# Patient Record
Sex: FEMALE | Race: BLACK OR AFRICAN AMERICAN | NOT HISPANIC OR LATINO | Employment: PART TIME | URBAN - METROPOLITAN AREA
[De-identification: names, ages, dates, MRNs, and addresses within clinical notes are randomized per-mention and may not be internally consistent; named-entity substitution may affect disease eponyms.]

---
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
## 2019-12-03 ENCOUNTER — HOSPITAL ENCOUNTER (EMERGENCY)
Facility: HOSPITAL | Age: 19
Discharge: HOME | End: 2019-12-03
Attending: EMERGENCY MEDICINE
Payer: MEDICARE

## 2019-12-03 ENCOUNTER — APPOINTMENT (EMERGENCY)
Dept: RADIOLOGY | Facility: HOSPITAL | Age: 19
End: 2019-12-03
Payer: MEDICARE

## 2019-12-03 VITALS
RESPIRATION RATE: 5 BRPM | HEART RATE: 74 BPM | OXYGEN SATURATION: 99 % | TEMPERATURE: 96.9 F | DIASTOLIC BLOOD PRESSURE: 70 MMHG | SYSTOLIC BLOOD PRESSURE: 123 MMHG | WEIGHT: 129.2 LBS

## 2019-12-03 DIAGNOSIS — R00.2 PALPITATIONS: Primary | ICD-10-CM

## 2019-12-03 LAB
EXT PREG TEST URINE: NORMAL
EXT. CONTROL ED NAV: NEGATIVE

## 2019-12-03 PROCEDURE — 71046 X-RAY EXAM CHEST 2 VIEWS: CPT

## 2019-12-03 PROCEDURE — 99285 EMERGENCY DEPT VISIT HI MDM: CPT

## 2019-12-03 PROCEDURE — 99285 EMERGENCY DEPT VISIT HI MDM: CPT | Performed by: EMERGENCY MEDICINE

## 2019-12-03 PROCEDURE — 93005 ELECTROCARDIOGRAM TRACING: CPT

## 2019-12-03 PROCEDURE — 81025 URINE PREGNANCY TEST: CPT | Performed by: EMERGENCY MEDICINE

## 2019-12-03 RX ORDER — IBUPROFEN 600 MG/1
600 TABLET ORAL ONCE
Status: COMPLETED | OUTPATIENT
Start: 2019-12-03 | End: 2019-12-03

## 2019-12-03 RX ADMIN — IBUPROFEN 600 MG: 600 TABLET, FILM COATED ORAL at 20:57

## 2019-12-04 LAB
ATRIAL RATE: 62 BPM
P AXIS: 54 DEGREES
PR INTERVAL: 162 MS
QRS AXIS: 57 DEGREES
QRSD INTERVAL: 76 MS
QT INTERVAL: 388 MS
QTC INTERVAL: 393 MS
T WAVE AXIS: 41 DEGREES
VENTRICULAR RATE: 62 BPM

## 2019-12-04 PROCEDURE — 93010 ELECTROCARDIOGRAM REPORT: CPT | Performed by: INTERNAL MEDICINE

## 2019-12-04 NOTE — ED PROVIDER NOTES
History  Chief Complaint   Patient presents with    Chest Pain     pt  reporting heart palpitations & chest pain   "my heart & my chest is mk' in"     Patient is a 25-year-old female, presenting for complaints of palpitations and which she describes as chest discomfort  She states that she works at [employer] where she was playing  and while she was doing this began to have palpitations and feeling like she had increased anxiety  Denies overt chest pain  Denies history of cardiac disease, smoking, family history of early cardiac death  States she had similar symptoms when she was about 7years old associated with increased stress  She does note that she is under constant stress at this time she states she is in college, going full time during the day and working at the "[employer]" at night to pay her bills  She has no chest pain here in the emergency room her symptoms have resolved  Denies any associated nausea vomiting or diaphoresis  None       Past Medical History:   Diagnosis Date    Palpitations        History reviewed  No pertinent surgical history  History reviewed  No pertinent family history  I have reviewed and agree with the history as documented  Social History     Tobacco Use    Smoking status: Current Some Day Smoker     Types: E-Cigarettes, Cigars    Smokeless tobacco: Never Used   Substance Use Topics    Alcohol use: No    Drug use: No        Review of Systems   Constitutional: Negative  Negative for chills and fever  HENT: Negative  Negative for rhinorrhea, sore throat, trouble swallowing and voice change  Eyes: Negative  Negative for pain and visual disturbance  Respiratory: Negative  Negative for cough, shortness of breath and wheezing  Cardiovascular: Positive for palpitations  Negative for chest pain  Gastrointestinal: Negative for abdominal pain, diarrhea, nausea and vomiting  Genitourinary: Negative    Negative for dysuria and frequency  Musculoskeletal: Negative  Negative for neck pain and neck stiffness  Skin: Negative  Negative for rash  Neurological: Negative  Negative for dizziness, speech difficulty, weakness, light-headedness and numbness  Physical Exam  Physical Exam   Constitutional: She is oriented to person, place, and time  She appears well-developed and well-nourished  No distress  HENT:   Head: Normocephalic and atraumatic  Mouth/Throat: Oropharynx is clear and moist    Eyes: Pupils are equal, round, and reactive to light  Conjunctivae and EOM are normal    Neck: Normal range of motion  Neck supple  No tracheal deviation present  Cardiovascular: Normal rate, regular rhythm and intact distal pulses  Pulmonary/Chest: Effort normal and breath sounds normal  No respiratory distress  She has no wheezes  She has no rales  Abdominal: Soft  Bowel sounds are normal  She exhibits no distension  There is no tenderness  There is no rebound and no guarding  Musculoskeletal: Normal range of motion  She exhibits no tenderness or deformity  Neurological: She is alert and oriented to person, place, and time  Skin: Skin is warm and dry  Capillary refill takes less than 2 seconds  No rash noted  Psychiatric: She has a normal mood and affect  Her behavior is normal    Nursing note and vitals reviewed        Vital Signs  ED Triage Vitals [12/03/19 1950]   Temperature Pulse Respirations Blood Pressure SpO2   (!) 96 9 °F (36 1 °C) 65 16 125/75 99 %      Temp Source Heart Rate Source Patient Position - Orthostatic VS BP Location FiO2 (%)   Tympanic Monitor Lying Left arm --      Pain Score       Worst Possible Pain           Vitals:    12/03/19 2000 12/03/19 2015 12/03/19 2030 12/03/19 2045   BP: (!) 119/31  125/73 123/70   Pulse: 81 72 64 74   Patient Position - Orthostatic VS: Lying  Lying          Visual Acuity      ED Medications  Medications   ibuprofen (MOTRIN) tablet 600 mg (600 mg Oral Given 12/3/19 2057) Diagnostic Studies  Results Reviewed     Procedure Component Value Units Date/Time    POCT pregnancy, urine [[id number]]  (Normal) Resulted:  12/03/19 2006    Lab Status:  Final result Updated:  12/03/19 2006     EXT PREG TEST UR (Ref: Negative) neg  Control negative                 XR chest 2 views   ED Interpretation by Pat Forte DO (12/03 2040)   No acute pna or ptx                 Procedures  Procedures         ED Course  ED Course as of Dec 04 0002   Tue Dec 03, 2019   1954 Procedure Note: EKG  Date/Time: 12/03/19 7:54 PM   Performed by: Lily Mccurdy  Authorized by: Lily Mccurdy  ECG interpreted by me, the ED Provider: yes   The EKG demonstrates:  Rate 62  Rhythm sinus  QTc 393  No ST elevations/depressions                                      MDM  Number of Diagnoses or Management Options  Palpitations:   Diagnosis management comments: Patient is a 21-year-old female who presented for concerns of palpitations and chest discomfort  She has no risk factors for early cardiac death, she states that she has no medications, no recent travel, no cough hemoptysis, unilateral leg swelling or long a car rides  No personal history of DVT or PE  Patient can be ruled out for a pulmonary embolism based on PERC rule and low risk Wells criteria  EKG shows sinus rhythm, a chest x-ray was unremarkable  Patient was advised that all cardiac abnormalities cannot be ruled out in the emergency room but that no further testing her blood work was required for evaluation today here in the emergency room  She is advised she needs to establish herself with a primary care physician as well as see a cardiologist as part outpatient follow-up  Strict return precautions were discussed         Amount and/or Complexity of Data Reviewed  Tests in the radiology section of CPT®: ordered and reviewed  Independent visualization of images, tracings, or specimens: yes          Disposition  Final diagnoses:   Palpitations Time reflects when diagnosis was documented in both MDM as applicable and the Disposition within this note     Time User Action Codes Description Comment    12/3/2019  8:41 PM Jeanna Zeng Add [R00 2] Palpitations       ED Disposition     ED Disposition Condition Date/Time Comment    Discharge Stable Tue Dec 3, 2019  8:41 PM Roberta Wasserman discharge to home/self care  Follow-up Information     Follow up With Specialties Details Why Contact Info Additional Information    Cierra Newton MD Family Medicine Schedule an appointment as soon as possible for a visit in 1 week  [address] [facility] [address]  [address] 263 Schedule an appointment as soon as possible for a visit in 1 week  [address], [address] [facility] [address]  [address], [address], [address] [place], [place], [address] Cardiology Schedule an appointment as soon as possible for a visit  As needed [address] [facility] [address] [facility] [address], [address], [place], [place], [id number] [phone]          There are no discharge medications for this patient  No discharge procedures on file      ED Provider  Electronically Signed by           Kiko Phan DO  12/04/19 0005

## 2020-10-16 ENCOUNTER — HOSPITAL ENCOUNTER (EMERGENCY)
Facility: HOSPITAL | Age: 20
Discharge: HOME | End: 2020-10-16
Attending: EMERGENCY MEDICINE | Admitting: EMERGENCY MEDICINE
Payer: MEDICARE

## 2020-10-16 VITALS
WEIGHT: 146 LBS | OXYGEN SATURATION: 99 % | SYSTOLIC BLOOD PRESSURE: 125 MMHG | TEMPERATURE: 98.3 F | RESPIRATION RATE: 18 BRPM | HEART RATE: 63 BPM | DIASTOLIC BLOOD PRESSURE: 67 MMHG

## 2020-10-16 DIAGNOSIS — R07.9 CHEST PAIN: ICD-10-CM

## 2020-10-16 DIAGNOSIS — R06.02 SOB (SHORTNESS OF BREATH): ICD-10-CM

## 2020-10-16 DIAGNOSIS — R10.84 GENERALIZED ABDOMINAL PAIN: Primary | ICD-10-CM

## 2020-10-16 DIAGNOSIS — R11.0 NAUSEA: ICD-10-CM

## 2020-10-16 LAB
ALBUMIN SERPL BCP-MCNC: 4.3 G/DL (ref 3.5–5)
ALP SERPL-CCNC: 68 U/L (ref 46–116)
ALT SERPL W P-5'-P-CCNC: 18 U/L (ref 12–78)
ANION GAP SERPL CALCULATED.3IONS-SCNC: 4 MMOL/L (ref 4–13)
AST SERPL W P-5'-P-CCNC: 17 U/L (ref 5–45)
ATRIAL RATE: 80 BPM
BASOPHILS # BLD AUTO: 0.03 THOUSANDS/ΜL (ref 0–0.1)
BASOPHILS NFR BLD AUTO: 1 % (ref 0–1)
BILIRUB SERPL-MCNC: 0.28 MG/DL (ref 0.2–1)
BUN SERPL-MCNC: 13 MG/DL (ref 5–25)
CALCIUM SERPL-MCNC: 9.8 MG/DL (ref 8.3–10.1)
CHLORIDE SERPL-SCNC: 104 MMOL/L (ref 100–108)
CO2 SERPL-SCNC: 28 MMOL/L (ref 21–32)
CREAT SERPL-MCNC: 0.76 MG/DL (ref 0.6–1.3)
EOSINOPHIL # BLD AUTO: 0.05 THOUSAND/ΜL (ref 0–0.61)
EOSINOPHIL NFR BLD AUTO: 1 % (ref 0–6)
ERYTHROCYTE [DISTWIDTH] IN BLOOD BY AUTOMATED COUNT: 12.5 % (ref 11.6–15.1)
GFR SERPL CREATININE-BSD FRML MDRD: 131 ML/MIN/1.73SQ M
GLUCOSE SERPL-MCNC: 89 MG/DL (ref 65–140)
HCT VFR BLD AUTO: 38.1 % (ref 34.8–46.1)
HGB BLD-MCNC: 12.3 G/DL (ref 11.5–15.4)
IMM GRANULOCYTES # BLD AUTO: 0.02 THOUSAND/UL (ref 0–0.2)
IMM GRANULOCYTES NFR BLD AUTO: 0 % (ref 0–2)
LIPASE SERPL-CCNC: 83 U/L (ref 73–393)
LYMPHOCYTES # BLD AUTO: 1.45 THOUSANDS/ΜL (ref 0.6–4.47)
LYMPHOCYTES NFR BLD AUTO: 26 % (ref 14–44)
MCH RBC QN AUTO: 28.9 PG (ref 26.8–34.3)
MCHC RBC AUTO-ENTMCNC: 32.3 G/DL (ref 31.4–37.4)
MCV RBC AUTO: 90 FL (ref 82–98)
MONOCYTES # BLD AUTO: 0.54 THOUSAND/ΜL (ref 0.17–1.22)
MONOCYTES NFR BLD AUTO: 10 % (ref 4–12)
NEUTROPHILS # BLD AUTO: 3.53 THOUSANDS/ΜL (ref 1.85–7.62)
NEUTS SEG NFR BLD AUTO: 62 % (ref 43–75)
NRBC BLD AUTO-RTO: 0 /100 WBCS
P AXIS: 45 DEGREES
PLATELET # BLD AUTO: 348 THOUSANDS/UL (ref 149–390)
PMV BLD AUTO: 9.4 FL (ref 8.9–12.7)
POTASSIUM SERPL-SCNC: 4 MMOL/L (ref 3.5–5.3)
PR INTERVAL: 164 MS
PROT SERPL-MCNC: 8.7 G/DL (ref 6.4–8.2)
QRS AXIS: 61 DEGREES
QRSD INTERVAL: 72 MS
QT INTERVAL: 390 MS
QTC INTERVAL: 449 MS
RBC # BLD AUTO: 4.25 MILLION/UL (ref 3.81–5.12)
SODIUM SERPL-SCNC: 136 MMOL/L (ref 136–145)
T WAVE AXIS: 41 DEGREES
VENTRICULAR RATE: 80 BPM
WBC # BLD AUTO: 5.62 THOUSAND/UL (ref 4.31–10.16)

## 2020-10-16 PROCEDURE — 83690 ASSAY OF LIPASE: CPT | Performed by: STUDENT IN AN ORGANIZED HEALTH CARE EDUCATION/TRAINING PROGRAM

## 2020-10-16 PROCEDURE — 96375 TX/PRO/DX INJ NEW DRUG ADDON: CPT

## 2020-10-16 PROCEDURE — 99284 EMERGENCY DEPT VISIT MOD MDM: CPT

## 2020-10-16 PROCEDURE — 85025 COMPLETE CBC W/AUTO DIFF WBC: CPT | Performed by: STUDENT IN AN ORGANIZED HEALTH CARE EDUCATION/TRAINING PROGRAM

## 2020-10-16 PROCEDURE — 80053 COMPREHEN METABOLIC PANEL: CPT | Performed by: STUDENT IN AN ORGANIZED HEALTH CARE EDUCATION/TRAINING PROGRAM

## 2020-10-16 PROCEDURE — 99284 EMERGENCY DEPT VISIT MOD MDM: CPT | Performed by: EMERGENCY MEDICINE

## 2020-10-16 PROCEDURE — 96374 THER/PROPH/DIAG INJ IV PUSH: CPT

## 2020-10-16 PROCEDURE — 36415 COLL VENOUS BLD VENIPUNCTURE: CPT | Performed by: STUDENT IN AN ORGANIZED HEALTH CARE EDUCATION/TRAINING PROGRAM

## 2020-10-16 PROCEDURE — 93005 ELECTROCARDIOGRAM TRACING: CPT

## 2020-10-16 PROCEDURE — 93010 ELECTROCARDIOGRAM REPORT: CPT | Performed by: INTERNAL MEDICINE

## 2020-10-16 RX ORDER — KETOROLAC TROMETHAMINE 30 MG/ML
30 INJECTION, SOLUTION INTRAMUSCULAR; INTRAVENOUS ONCE
Status: COMPLETED | OUTPATIENT
Start: 2020-10-16 | End: 2020-10-16

## 2020-10-16 RX ORDER — ONDANSETRON 2 MG/ML
4 INJECTION INTRAMUSCULAR; INTRAVENOUS ONCE
Status: COMPLETED | OUTPATIENT
Start: 2020-10-16 | End: 2020-10-16

## 2020-10-16 RX ORDER — FERROUS SULFATE 325(65) MG
325 TABLET ORAL
COMMUNITY

## 2020-10-16 RX ORDER — MULTIVITAMIN
1 CAPSULE ORAL DAILY
COMMUNITY

## 2020-10-16 RX ADMIN — KETOROLAC TROMETHAMINE 30 MG: 30 INJECTION, SOLUTION INTRAMUSCULAR at 16:31

## 2020-10-16 RX ADMIN — ONDANSETRON 4 MG: 2 INJECTION INTRAMUSCULAR; INTRAVENOUS at 16:31

## 2020-12-07 ENCOUNTER — HOSPITAL ENCOUNTER (EMERGENCY)
Facility: HOSPITAL | Age: 20
Discharge: HOME | End: 2020-12-07
Attending: EMERGENCY MEDICINE
Payer: MEDICARE

## 2020-12-07 VITALS
HEART RATE: 68 BPM | DIASTOLIC BLOOD PRESSURE: 68 MMHG | RESPIRATION RATE: 16 BRPM | OXYGEN SATURATION: 100 % | WEIGHT: 148.81 LBS | TEMPERATURE: 98.5 F | SYSTOLIC BLOOD PRESSURE: 136 MMHG

## 2020-12-07 DIAGNOSIS — S69.92XA FINGERNAIL INJURY, LEFT, INITIAL ENCOUNTER: Primary | ICD-10-CM

## 2020-12-07 PROCEDURE — 99284 EMERGENCY DEPT VISIT MOD MDM: CPT | Performed by: PHYSICIAN ASSISTANT

## 2020-12-07 PROCEDURE — 99283 EMERGENCY DEPT VISIT LOW MDM: CPT

## 2020-12-07 RX ORDER — GINSENG 100 MG
1 CAPSULE ORAL 2 TIMES DAILY
Qty: 28 G | Refills: 0 | Status: SHIPPED | OUTPATIENT
Start: 2020-12-07

## 2020-12-07 RX ORDER — NAPROXEN 500 MG/1
500 TABLET ORAL 2 TIMES DAILY WITH MEALS
Qty: 30 TABLET | Refills: 0 | Status: SHIPPED | OUTPATIENT
Start: 2020-12-07

## 2020-12-07 RX ORDER — LIDOCAINE HYDROCHLORIDE 20 MG/ML
10 INJECTION, SOLUTION EPIDURAL; INFILTRATION; INTRACAUDAL; PERINEURAL ONCE
Status: COMPLETED | OUTPATIENT
Start: 2020-12-07 | End: 2020-12-07

## 2020-12-07 RX ORDER — GINSENG 100 MG
1 CAPSULE ORAL ONCE
Status: COMPLETED | OUTPATIENT
Start: 2020-12-07 | End: 2020-12-07

## 2020-12-07 RX ADMIN — LIDOCAINE HYDROCHLORIDE 10 ML: 20 INJECTION, SOLUTION EPIDURAL; INFILTRATION; INTRACAUDAL; PERINEURAL at 17:42

## 2020-12-07 RX ADMIN — BACITRACIN ZINC 1 SMALL APPLICATION: 500 OINTMENT TOPICAL at 17:42

## 2020-12-13 ENCOUNTER — APPOINTMENT (EMERGENCY)
Dept: CT IMAGING | Facility: HOSPITAL | Age: 20
End: 2020-12-13
Payer: MEDICARE

## 2020-12-13 ENCOUNTER — HOSPITAL ENCOUNTER (EMERGENCY)
Facility: HOSPITAL | Age: 20
Discharge: HOME | End: 2020-12-13
Attending: EMERGENCY MEDICINE | Admitting: EMERGENCY MEDICINE
Payer: MEDICARE

## 2020-12-13 VITALS
DIASTOLIC BLOOD PRESSURE: 62 MMHG | SYSTOLIC BLOOD PRESSURE: 115 MMHG | TEMPERATURE: 99 F | OXYGEN SATURATION: 100 % | WEIGHT: 149.47 LBS | HEART RATE: 71 BPM | RESPIRATION RATE: 16 BRPM

## 2020-12-13 DIAGNOSIS — R10.84 GENERALIZED ABDOMINAL PAIN: Primary | ICD-10-CM

## 2020-12-13 LAB
ALBUMIN SERPL BCP-MCNC: 3.7 G/DL (ref 3.5–5)
ALP SERPL-CCNC: 59 U/L (ref 46–116)
ALT SERPL W P-5'-P-CCNC: 22 U/L (ref 12–78)
ANION GAP SERPL CALCULATED.3IONS-SCNC: 5 MMOL/L (ref 4–13)
AST SERPL W P-5'-P-CCNC: 19 U/L (ref 5–45)
BASOPHILS # BLD AUTO: 0.02 THOUSANDS/ΜL (ref 0–0.1)
BASOPHILS NFR BLD AUTO: 1 % (ref 0–1)
BILIRUB SERPL-MCNC: 0.25 MG/DL (ref 0.2–1)
BILIRUB UR QL STRIP: NEGATIVE
BUN SERPL-MCNC: 15 MG/DL (ref 5–25)
CALCIUM SERPL-MCNC: 8.8 MG/DL (ref 8.3–10.1)
CHLORIDE SERPL-SCNC: 103 MMOL/L (ref 100–108)
CLARITY UR: CLEAR
CO2 SERPL-SCNC: 29 MMOL/L (ref 21–32)
COLOR UR: YELLOW
COLOR, POC: YELLOW
CREAT SERPL-MCNC: 0.76 MG/DL (ref 0.6–1.3)
EOSINOPHIL # BLD AUTO: 0.14 THOUSAND/ΜL (ref 0–0.61)
EOSINOPHIL NFR BLD AUTO: 4 % (ref 0–6)
ERYTHROCYTE [DISTWIDTH] IN BLOOD BY AUTOMATED COUNT: 12.7 % (ref 11.6–15.1)
EXT PREG TEST URINE: NORMAL
EXT. CONTROL ED NAV: NORMAL
GFR SERPL CREATININE-BSD FRML MDRD: 130 ML/MIN/1.73SQ M
GLUCOSE SERPL-MCNC: 84 MG/DL (ref 65–140)
GLUCOSE UR STRIP-MCNC: NEGATIVE MG/DL
HCT VFR BLD AUTO: 37.7 % (ref 34.8–46.1)
HGB BLD-MCNC: 12.1 G/DL (ref 11.5–15.4)
HGB UR QL STRIP.AUTO: NEGATIVE
IMM GRANULOCYTES # BLD AUTO: 0.01 THOUSAND/UL (ref 0–0.2)
IMM GRANULOCYTES NFR BLD AUTO: 0 % (ref 0–2)
KETONES UR STRIP-MCNC: NEGATIVE MG/DL
LEUKOCYTE ESTERASE UR QL STRIP: NEGATIVE
LIPASE SERPL-CCNC: 164 U/L (ref 73–393)
LYMPHOCYTES # BLD AUTO: 1.46 THOUSANDS/ΜL (ref 0.6–4.47)
LYMPHOCYTES NFR BLD AUTO: 38 % (ref 14–44)
MCH RBC QN AUTO: 28.9 PG (ref 26.8–34.3)
MCHC RBC AUTO-ENTMCNC: 32.1 G/DL (ref 31.4–37.4)
MCV RBC AUTO: 90 FL (ref 82–98)
MONOCYTES # BLD AUTO: 0.54 THOUSAND/ΜL (ref 0.17–1.22)
MONOCYTES NFR BLD AUTO: 14 % (ref 4–12)
NEUTROPHILS # BLD AUTO: 1.7 THOUSANDS/ΜL (ref 1.85–7.62)
NEUTS SEG NFR BLD AUTO: 43 % (ref 43–75)
NITRITE UR QL STRIP: NEGATIVE
NRBC BLD AUTO-RTO: 0 /100 WBCS
PH UR STRIP.AUTO: 7 [PH] (ref 4.5–8)
PLATELET # BLD AUTO: 329 THOUSANDS/UL (ref 149–390)
PMV BLD AUTO: 9.3 FL (ref 8.9–12.7)
POTASSIUM SERPL-SCNC: 3.8 MMOL/L (ref 3.5–5.3)
PROT SERPL-MCNC: 7.6 G/DL (ref 6.4–8.2)
PROT UR STRIP-MCNC: NEGATIVE MG/DL
RBC # BLD AUTO: 4.19 MILLION/UL (ref 3.81–5.12)
SODIUM SERPL-SCNC: 137 MMOL/L (ref 136–145)
SP GR UR STRIP.AUTO: 1.02 (ref 1–1.03)
UROBILINOGEN UR QL STRIP.AUTO: 1 E.U./DL
WBC # BLD AUTO: 3.87 THOUSAND/UL (ref 4.31–10.16)

## 2020-12-13 PROCEDURE — 99284 EMERGENCY DEPT VISIT MOD MDM: CPT

## 2020-12-13 PROCEDURE — 81003 URINALYSIS AUTO W/O SCOPE: CPT

## 2020-12-13 PROCEDURE — 81025 URINE PREGNANCY TEST: CPT | Performed by: EMERGENCY MEDICINE

## 2020-12-13 PROCEDURE — 80053 COMPREHEN METABOLIC PANEL: CPT | Performed by: PHYSICIAN ASSISTANT

## 2020-12-13 PROCEDURE — G1004 CDSM NDSC: HCPCS

## 2020-12-13 PROCEDURE — 96361 HYDRATE IV INFUSION ADD-ON: CPT

## 2020-12-13 PROCEDURE — 83690 ASSAY OF LIPASE: CPT | Performed by: PHYSICIAN ASSISTANT

## 2020-12-13 PROCEDURE — 96376 TX/PRO/DX INJ SAME DRUG ADON: CPT

## 2020-12-13 PROCEDURE — 74177 CT ABD & PELVIS W/CONTRAST: CPT

## 2020-12-13 PROCEDURE — 96374 THER/PROPH/DIAG INJ IV PUSH: CPT

## 2020-12-13 PROCEDURE — 36415 COLL VENOUS BLD VENIPUNCTURE: CPT | Performed by: PHYSICIAN ASSISTANT

## 2020-12-13 PROCEDURE — 85025 COMPLETE CBC W/AUTO DIFF WBC: CPT | Performed by: PHYSICIAN ASSISTANT

## 2020-12-13 PROCEDURE — 99284 EMERGENCY DEPT VISIT MOD MDM: CPT | Performed by: PHYSICIAN ASSISTANT

## 2020-12-13 RX ORDER — KETOROLAC TROMETHAMINE 30 MG/ML
15 INJECTION, SOLUTION INTRAMUSCULAR; INTRAVENOUS ONCE
Status: COMPLETED | OUTPATIENT
Start: 2020-12-13 | End: 2020-12-13

## 2020-12-13 RX ORDER — ONDANSETRON 4 MG/1
4 TABLET, ORALLY DISINTEGRATING ORAL EVERY 6 HOURS PRN
Qty: 20 TABLET | Refills: 0 | Status: SHIPPED | OUTPATIENT
Start: 2020-12-13

## 2020-12-13 RX ORDER — DICYCLOMINE HCL 20 MG
20 TABLET ORAL 2 TIMES DAILY PRN
Qty: 20 TABLET | Refills: 0 | Status: SHIPPED | OUTPATIENT
Start: 2020-12-13

## 2020-12-13 RX ORDER — KETOROLAC TROMETHAMINE 10 MG/1
10 TABLET, FILM COATED ORAL EVERY 6 HOURS PRN
Qty: 20 TABLET | Refills: 0 | Status: SHIPPED | OUTPATIENT
Start: 2020-12-13

## 2020-12-13 RX ADMIN — IOHEXOL 100 ML: 350 INJECTION, SOLUTION INTRAVENOUS at 17:06

## 2020-12-13 RX ADMIN — KETOROLAC TROMETHAMINE 15 MG: 30 INJECTION, SOLUTION INTRAMUSCULAR at 15:57

## 2020-12-13 RX ADMIN — SODIUM CHLORIDE 1000 ML: 0.9 INJECTION, SOLUTION INTRAVENOUS at 15:56

## 2020-12-13 RX ADMIN — KETOROLAC TROMETHAMINE 15 MG: 30 INJECTION, SOLUTION INTRAMUSCULAR at 17:55

## 2022-12-10 ENCOUNTER — OFFICE VISIT (OUTPATIENT)
Dept: URGENT CARE | Age: 22
End: 2022-12-10

## 2022-12-10 VITALS — RESPIRATION RATE: 18 BRPM | TEMPERATURE: 97.8 F | HEART RATE: 84 BPM | OXYGEN SATURATION: 98 %

## 2022-12-10 DIAGNOSIS — R05.1 ACUTE COUGH: Primary | ICD-10-CM

## 2022-12-10 RX ORDER — PREDNISONE 20 MG/1
20 TABLET ORAL 2 TIMES DAILY WITH MEALS
Qty: 10 TABLET | Refills: 0 | Status: SHIPPED | OUTPATIENT
Start: 2022-12-10 | End: 2022-12-15

## 2022-12-10 RX ORDER — BROMPHENIRAMINE MALEATE, PSEUDOEPHEDRINE HYDROCHLORIDE, AND DEXTROMETHORPHAN HYDROBROMIDE 2; 30; 10 MG/5ML; MG/5ML; MG/5ML
5 SYRUP ORAL 4 TIMES DAILY PRN
Qty: 120 ML | Refills: 0 | Status: SHIPPED | OUTPATIENT
Start: 2022-12-10

## 2022-12-10 RX ORDER — PSEUDOEPHED/ACETAMINOPH/DIPHEN 30MG-500MG
TABLET ORAL
COMMUNITY
Start: 2022-10-19

## 2022-12-10 RX ORDER — IBUPROFEN 600 MG/1
TABLET ORAL
COMMUNITY
Start: 2022-10-19

## 2022-12-10 RX ORDER — CETIRIZINE HYDROCHLORIDE 10 MG/1
10 TABLET ORAL DAILY
COMMUNITY

## 2022-12-10 RX ORDER — PEDI MULTIVIT NO.91/IRON FUM 15 MG
1 TABLET,CHEWABLE ORAL DAILY
COMMUNITY
Start: 2020-11-09

## 2022-12-10 RX ORDER — ALBUTEROL SULFATE 90 UG/1
2 AEROSOL, METERED RESPIRATORY (INHALATION) EVERY 6 HOURS PRN
Qty: 8.5 G | Refills: 0 | Status: SHIPPED | OUTPATIENT
Start: 2022-12-10 | End: 2022-12-15

## 2022-12-10 NOTE — PROGRESS NOTES
[facility]        NAME: Renay Garner is a 20 y o  female  : 2000    MRN: [id number]  DATE: December 10, 2022  TIME: 11:41 AM    Assessment and Plan   Acute cough [R05 1]  1  Acute cough  brompheniramine-pseudoephedrine-DM 30-2-10 MG/5ML syrup    predniSONE 20 mg tablet    albuterol (ProAir HFA) 90 mcg/act inhaler    Covid/Flu-Office Collect      26-year-old female presents for evaluation of upper respiratory symptoms over the past week  COVID/flu cultures pending, will trial Bromfed, short course of prednisone and albuterol for cough, congestion and wheezing  Patient advised to follow-up with primary care provider if symptoms do not resolve within 1 to 2 weeks  Patient Instructions   Upper Respiratory Infection   WHAT YOU NEED TO KNOW:   An upper respiratory infection is also called a cold  It can affect your nose, throat, ears, and sinuses  Cold symptoms are usually worst for the first 3 to 5 days  Most people get better in 7 to 14 days  You may continue to cough for 2 to 3 weeks  Colds are caused by viruses and do not get better with antibiotics  DISCHARGE INSTRUCTIONS:   Call your local emergency number (911 in the [address] [place] [address],[address]) if:   • You have chest pain or trouble breathing         Return to the emergency department if:   • You have a fever over 102ºF (39ºC)        Call your doctor if:   • You have a low fever      • Your sore throat gets worse or you see white or yellow spots in your throat      • Your symptoms get worse after 3 to 5 days or are not better in 14 days      • You have a rash anywhere on your skin      • You have large, tender lumps in your neck      • You have thick, green, or yellow drainage from your nose      • You cough up thick yellow, green, or bloody mucus      • You have a bad earache      • You have questions or concerns about your condition or care      Medicines:   You may need any of the following:  • Decongestants  help reduce nasal congestion and help you breathe more easily  If you take decongestant pills, they may make you feel restless or cause problems with your sleep  Do not use decongestant sprays for more than a few days      • Cough suppressants  help reduce coughing  Ask your healthcare provider which type of cough medicine is best for you       • NSAIDs , such as ibuprofen, help decrease swelling, pain, and fever  NSAIDs can cause stomach bleeding or kidney problems in certain people  If you take blood thinner medicine, always ask your healthcare provider if NSAIDs are safe for you  Always read the medicine label and follow directions      • Acetaminophen  decreases pain and fever  It is available without a doctor's order  Ask how much to take and how often to take it  Follow directions  Read the labels of all other medicines you are using to see if they also contain acetaminophen, or ask your doctor or pharmacist  Acetaminophen can cause liver damage if not taken correctly  Do not use more than 4 grams (4,000 milligrams) total of acetaminophen in one day       • Take your medicine as directed  Contact your healthcare provider if you think your medicine is not helping or if you have side effects  Tell him or her if you are allergic to any medicine  Keep a list of the medicines, vitamins, and herbs you take  Include the amounts, and when and why you take them  Bring the list or the pill bottles to follow-up visits  Carry your medicine list with you in case of an emergency      Self-care:   • Rest as much as possible  Slowly start to do more each day      • Drink more liquids as directed  Liquids will help thin and loosen mucus so you can cough it up  Liquids will also help prevent dehydration  Liquids that help prevent dehydration include water, fruit juice, and broth  Do not drink liquids that contain caffeine  Caffeine can increase your risk for dehydration  Ask your healthcare provider how much liquid to drink each day      • Soothe a sore throat  Gargle with warm salt water  Make salt water by dissolving ¼ teaspoon salt in 1 cup warm water  You may also suck on hard candy or throat lozenges  You may use a sore throat spray      • Use a humidifier or vaporizer  Use a cool mist humidifier or a vaporizer to increase air moisture in your home  This may make it easier for you to breathe and help decrease your cough      • Use saline nasal drops as directed  These help relieve congestion      • Apply petroleum-based jelly around the outside of your nostrils  This can decrease irritation from blowing your nose      • Do not smoke  Nicotine and other chemicals in cigarettes and cigars can make your symptoms worse  They can also cause infections such as bronchitis or pneumonia  Ask your healthcare provider for information if you currently smoke and need help to quit  E-cigarettes or smokeless tobacco still contain nicotine  Talk to your healthcare provider before you use these products      Prevent a cold:   • Wash your hands often  Use soap and water every time you wash your hands  Rub your soapy hands together, lacing your fingers  Use the fingers of one hand to scrub under the nails of the other hand  Wash for at least 20 seconds  Rinse with warm, running water for several seconds  Then dry your hands  Use germ-killing gel if soap and water are not available  Do not touch your eyes or mouth without washing your hands first           • Cover a sneeze or cough  Use a tissue that covers your mouth and nose  Put the used tissue in the trash right away  Use the bend of your arm if a tissue is not available  Wash your hands well with soap and water or use a hand   Do not stand close to anyone who is sneezing or coughing      • Try to stay away from others while you are sick  This is especially important during the first 2 to 3 days when the virus is more easily spread   Wait until a fever, cough, or other symptoms are gone before you return to work or other regular activities      • Do not share items while you are sick  This includes food, drinks, eating utensils, and dishes      Follow up with your doctor as directed:  Write down your questions so you remember to ask them during your visits  © Copyright [address] 2022 Information is for End User's use only and may not be sold, redistributed or otherwise used for commercial purposes  All illustrations and images included in CareNotes® are the copyrighted property of A D A M [employer]  or [facility] Rowena Quintanilla   The above information is an  only  It is not intended as medical advice for individual conditions or treatments  Talk to your doctor, nurse or pharmacist before following any medical regimen to see if it is safe and effective for you            Follow up with PCP in 3-5 days  Proceed to  ER if symptoms worsen  Chief Complaint     Chief Complaint   Patient presents with   • Cold Like Symptoms   • Cough   • Wheezing   • Shortness of Breath     Patient been sick for about 1 week with her symptoms- she states that she has greenish-yellow mucus from her nose         History of Present Illness       Patient is a 30-year-old female with no significant past medical history who presents for evaluation of cough, congestion over the past week  She reports sore throat, yellowish mucus and cough with some wheezing which is worse at night  She denies fever, chest pain, palpitations, shortness of breath  Review of Systems   Review of Systems   Constitutional: Negative for fatigue and fever  HENT: Positive for congestion and sore throat  Negative for ear discharge, ear pain, postnasal drip, rhinorrhea, sinus pressure, sinus pain and sneezing  Eyes: Negative  Negative for pain, discharge, redness and itching  Respiratory: Positive for cough and wheezing  Negative for apnea, choking, chest tightness, shortness of breath and stridor  Cardiovascular: Negative    Negative for chest pain and palpitations  Gastrointestinal: Negative  Negative for diarrhea, nausea and vomiting  Endocrine: Negative  Negative for polydipsia, polyphagia and polyuria  Genitourinary: Negative  Negative for decreased urine volume and flank pain  Musculoskeletal: Negative  Negative for arthralgias, back pain, myalgias, neck pain and neck stiffness  Skin: Negative  Negative for color change and rash  Allergic/Immunologic: Negative  Negative for environmental allergies  Neurological: Negative  Negative for dizziness, facial asymmetry, light-headedness, numbness and headaches  Hematological: Negative  Negative for adenopathy  Psychiatric/Behavioral: Negative  All other systems reviewed and are negative          Current Medications       Current Outpatient Medications:   •  albuterol (ProAir HFA) 90 mcg/act inhaler, Inhale 2 puffs every 6 (six) hours as needed for wheezing or shortness of breath for up to 5 days, Disp: 8 5 g, Rfl: 0  •  brompheniramine-pseudoephedrine-DM 30-2-10 MG/5ML syrup, Take 5 mL by mouth 4 (four) times a day as needed for congestion, cough or allergies, Disp: 120 mL, Rfl: 0  •  pediatric multivitamin-iron (POLY-VI-SOL with IRON) 15 MG chewable tablet, Chew 1 tablet daily, Disp: , Rfl:   •  predniSONE 20 mg tablet, Take 1 tablet (20 mg total) by mouth 2 (two) times a day with meals for 5 days, Disp: 10 tablet, Rfl: 0  •  Acetaminophen Extra Strength 500 MG tablet, TAKE ONE TABLET BY MOUTH EVERY 6 HOURS AS NEEDED FOR MILD PAIN (PAIN SCORE 1-3), Disp: , Rfl:   •  bacitracin topical ointment 500 units/g topical ointment, Apply 1 large application topically 2 (two) times a day, Disp: 28 g, Rfl: 0  •  cetirizine (ZyrTEC) 10 mg tablet, Take 10 mg by mouth daily, Disp: , Rfl:   •  dicyclomine (BENTYL) 20 mg tablet, Take 1 tablet (20 mg total) by mouth 2 (two) times a day as needed (Abdominal pain, diarrhea), Disp: 20 tablet, Rfl: 0  •  ferrous sulfate 325 (65 Fe) mg tablet, Take 325 mg by mouth daily with breakfast, Disp: , Rfl:   •  ibuprofen (MOTRIN) 600 mg tablet, TAKE ONE TABLET BY MOUTH EVERY 6 HOURS AS NEEDED FOR MILD PAIN (PAIN SCORE 1-3), Disp: , Rfl:   •  ketorolac (TORADOL) 10 mg tablet, Take 1 tablet (10 mg total) by mouth every 6 (six) hours as needed for moderate pain or severe pain, Disp: 20 tablet, Rfl: 0  •  Multiple Vitamin (multivitamin) capsule, Take 1 capsule by mouth daily, Disp: , Rfl:   •  naproxen (NAPROSYN) 500 mg tablet, Take 1 tablet (500 mg total) by mouth 2 (two) times a day with meals, Disp: 30 tablet, Rfl: 0  •  ondansetron (ZOFRAN-ODT) 4 mg disintegrating tablet, Take 1 tablet (4 mg total) by mouth every 6 (six) hours as needed for nausea or vomiting, Disp: 20 tablet, Rfl: 0    Current Allergies     Allergies as of 12/10/2022 - Reviewed 12/10/2022   Allergen Reaction Noted   • Cherry flavor - food allergy  10/23/2020   • Latex Other (See Comments) 02/26/2022   • Other  10/23/2020   • Tea tree oil  12/13/2020            The following portions of the patient's history were reviewed and updated as appropriate: allergies, current medications, past family history, past medical history, past social history, past surgical history and problem list      Past Medical History:   Diagnosis Date   • Palpitations        No past surgical history on file  No family history on file  Medications have been verified  Objective   Pulse 84   Temp 97 8 °F (36 6 °C)   Resp 18   SpO2 98%        Physical Exam     Physical Exam  Vitals and nursing note reviewed  Constitutional:       General: She is not in acute distress  Appearance: Normal appearance  She is not ill-appearing, toxic-appearing or diaphoretic  Interventions: She is not intubated  HENT:      Head: Normocephalic and atraumatic  Right Ear: Tympanic membrane normal       Left Ear: Tympanic membrane normal       Nose: Nose normal  No congestion or rhinorrhea        Mouth/Throat:      Mouth: Mucous membranes are moist       Pharynx: Oropharynx is clear  Uvula midline  No pharyngeal swelling, oropharyngeal exudate, posterior oropharyngeal erythema or uvula swelling  Tonsils: No tonsillar exudate or tonsillar abscesses  1+ on the right  1+ on the left  Eyes:      Extraocular Movements: Extraocular movements intact  Conjunctiva/sclera: Conjunctivae normal       Pupils: Pupils are equal, round, and reactive to light  Cardiovascular:      Rate and Rhythm: Normal rate and regular rhythm  Pulses: Normal pulses  Heart sounds: Normal heart sounds, S1 normal and S2 normal  Heart sounds not distant  No murmur heard  No friction rub  No gallop  Pulmonary:      Effort: Pulmonary effort is normal  No tachypnea, bradypnea, accessory muscle usage, prolonged expiration, respiratory distress or retractions  She is not intubated  Breath sounds: Normal breath sounds  No stridor, decreased air movement or transmitted upper airway sounds  No decreased breath sounds, wheezing, rhonchi or rales  Abdominal:      General: Bowel sounds are normal       Palpations: Abdomen is soft  Tenderness: There is no abdominal tenderness  There is no guarding or rebound  Musculoskeletal:         General: Normal range of motion  Cervical back: Normal range of motion and neck supple  No tenderness  Skin:     General: Skin is warm and dry  Capillary Refill: Capillary refill takes less than 2 seconds  Neurological:      General: No focal deficit present  Mental Status: She is alert and oriented to person, place, and time  Cranial Nerves: No cranial nerve deficit     Psychiatric:         Mood and Affect: Mood normal          Behavior: Behavior normal

## 2022-12-12 LAB
FLUAV RNA RESP QL NAA+PROBE: NEGATIVE
FLUBV RNA RESP QL NAA+PROBE: NEGATIVE
SARS-COV-2 RNA RESP QL NAA+PROBE: NEGATIVE

## 2022-12-22 ENCOUNTER — OFFICE VISIT (OUTPATIENT)
Dept: URGENT CARE | Age: 22
End: 2022-12-22

## 2022-12-22 VITALS
SYSTOLIC BLOOD PRESSURE: 134 MMHG | DIASTOLIC BLOOD PRESSURE: 67 MMHG | OXYGEN SATURATION: 97 % | HEART RATE: 93 BPM | TEMPERATURE: 98.7 F | RESPIRATION RATE: 20 BRPM

## 2022-12-22 DIAGNOSIS — R05.1 ACUTE COUGH: Primary | ICD-10-CM

## 2022-12-22 LAB
SARS-COV-2 AG UPPER RESP QL IA: NEGATIVE
VALID CONTROL: NORMAL

## 2022-12-23 NOTE — PROGRESS NOTES
[address] [facility]        NAME: Juan Jackson is a 20 y o  female  : 2000    MRN: [id number]  DATE: 2022  TIME: 7:42 PM    Assessment and Plan   Acute cough [R05 1]  1  Acute cough  Poct Covid 19 Rapid Antigen Test      Rapid COVID-negative, patient advised to continue over-the-counter decongestants, humidifier as needed  Follow-up with primary care provider if symptoms do not resolve within 1 to 2 weeks  Patient Instructions   Acute Cough   WHAT YOU NEED TO KNOW:   An acute cough can last up to 3 weeks  Common causes of an acute cough include a cold, allergies, or a lung infection  DISCHARGE INSTRUCTIONS:   Return to the emergency department if:   • You have trouble breathing or feel short of breath      • You cough up blood, or you see blood in your mucus      • You faint or feel weak or dizzy      • You have chest pain when you cough or take a deep breath      • You have new wheezing      Contact your healthcare provider if:   • You have a fever      • Your cough lasts longer than 4 weeks      • Your symptoms do not improve with treatment      • You have questions or concerns about your condition or care      Medicines:   • Medicines  may be needed to stop the cough, decrease swelling in your airways, or help open your airways  Medicine may also be given to help you cough up mucus  Ask your healthcare provider what over-the-counter medicines you can take  If you have an infection caused by bacteria, you may need antibiotics      • Take your medicine as directed  Contact your healthcare provider if you think your medicine is not helping or if you have side effects  Tell him or her if you are allergic to any medicine  Keep a list of the medicines, vitamins, and herbs you take  Include the amounts, and when and why you take them  Bring the list or the pill bottles to follow-up visits   Carry your medicine list with you in case of an emergency      Manage your symptoms:   • Do not smoke and stay away from others who smoke  Nicotine and other chemicals in cigarettes and cigars can cause lung damage and make your cough worse  Ask your healthcare provider for information if you currently smoke and need help to quit  E-cigarettes or smokeless tobacco still contain nicotine  Talk to your healthcare provider before you use these products      • Drink extra liquids as directed  Liquids will help thin and loosen mucus so you can cough it up  Liquids will also help prevent dehydration  Examples of good liquids to drink include water, fruit juice, and broth  Do not drink liquids that contain caffeine  Caffeine can increase your risk for dehydration  Ask your healthcare provider how much liquid to drink each day      • Rest as directed  Do not do activities that make your cough worse, such as exercise      • Use a humidifier or vaporizer  Use a cool mist humidifier or a vaporizer to increase air moisture in your home  This may make it easier for you to breathe and help decrease your cough      • Eat 2 to 5 mL of honey 2 times each day  Honey can help thin mucus and decrease your cough      • Use cough drops or lozenges  These can help decrease throat irritation and your cough      Follow up with your healthcare provider as directed:  Write down your questions so you remember to ask them during your visits  © Copyright [employer] 2022 Information is for End User's use only and may not be sold, redistributed or otherwise used for commercial purposes  All illustrations and images included in CareNotes® are the copyrighted property of [employer]  or Amanda Quintanilla   The above information is an  only  It is not intended as medical advice for individual conditions or treatments  Talk to your doctor, nurse or pharmacist before following any medical regimen to see if it is safe and effective for you        Follow up with PCP in 3-5 days    Proceed to  ER if symptoms worsen  Chief Complaint     Chief Complaint   Patient presents with   • Cough     Cough and runny nose since yesterday         History of Present Illness       Patient is a 69-year-old female with no significant past medical history presents for evaluation of cough, rhinorrhea and headache over the past week  She works in a  and reports that people are always coughing and sick at her workplace  She is requesting COVID testing at this time, although she was positive as recently as 10/18/2022  Of note, she was seen in this clinic on 12/10/2022 where COVID/flu test was performed and found to be negative  She was given Bromfed, prednisone and albuterol at that time  She denies fever, chest pain, palpitations, shortness of breath, lightheadedness/dizziness/syncope  Cough  Associated symptoms include headaches and rhinorrhea  Pertinent negatives include no chest pain, ear pain, eye redness, fever, myalgias, postnasal drip, rash, sore throat, shortness of breath or wheezing  There is no history of environmental allergies  Review of Systems   Review of Systems   Constitutional: Negative for fatigue and fever  HENT: Positive for rhinorrhea  Negative for congestion, ear discharge, ear pain, postnasal drip, sinus pressure, sinus pain, sneezing and sore throat  Eyes: Negative  Negative for pain, discharge, redness and itching  Respiratory: Positive for cough  Negative for apnea, choking, chest tightness, shortness of breath, wheezing and stridor  Cardiovascular: Negative  Negative for chest pain and palpitations  Gastrointestinal: Negative  Negative for diarrhea, nausea and vomiting  Endocrine: Negative  Negative for polydipsia, polyphagia and polyuria  Genitourinary: Negative  Negative for decreased urine volume and flank pain  Musculoskeletal: Negative  Negative for arthralgias, back pain, myalgias, neck pain and neck stiffness  Skin: Negative    Negative for color change and rash    Allergic/Immunologic: Negative  Negative for environmental allergies  Neurological: Positive for headaches  Negative for dizziness, facial asymmetry, light-headedness and numbness  Hematological: Negative  Negative for adenopathy  Psychiatric/Behavioral: Negative            Current Medications       Current Outpatient Medications:   •  Acetaminophen Extra Strength 500 MG tablet, TAKE ONE TABLET BY MOUTH EVERY 6 HOURS AS NEEDED FOR MILD PAIN (PAIN SCORE 1-3), Disp: , Rfl:   •  bacitracin topical ointment 500 units/g topical ointment, Apply 1 large application topically 2 (two) times a day, Disp: 28 g, Rfl: 0  •  brompheniramine-pseudoephedrine-DM 30-2-10 MG/5ML syrup, Take 5 mL by mouth 4 (four) times a day as needed for congestion, cough or allergies, Disp: 120 mL, Rfl: 0  •  cetirizine (ZyrTEC) 10 mg tablet, Take 10 mg by mouth daily, Disp: , Rfl:   •  dicyclomine (BENTYL) 20 mg tablet, Take 1 tablet (20 mg total) by mouth 2 (two) times a day as needed (Abdominal pain, diarrhea), Disp: 20 tablet, Rfl: 0  •  ferrous sulfate 325 (65 Fe) mg tablet, Take 325 mg by mouth daily with breakfast, Disp: , Rfl:   •  ibuprofen (MOTRIN) 600 mg tablet, TAKE ONE TABLET BY MOUTH EVERY 6 HOURS AS NEEDED FOR MILD PAIN (PAIN SCORE 1-3), Disp: , Rfl:   •  ketorolac (TORADOL) 10 mg tablet, Take 1 tablet (10 mg total) by mouth every 6 (six) hours as needed for moderate pain or severe pain, Disp: 20 tablet, Rfl: 0  •  Multiple Vitamin (multivitamin) capsule, Take 1 capsule by mouth daily, Disp: , Rfl:   •  naproxen (NAPROSYN) 500 mg tablet, Take 1 tablet (500 mg total) by mouth 2 (two) times a day with meals, Disp: 30 tablet, Rfl: 0  •  ondansetron (ZOFRAN-ODT) 4 mg disintegrating tablet, Take 1 tablet (4 mg total) by mouth every 6 (six) hours as needed for nausea or vomiting, Disp: 20 tablet, Rfl: 0  •  pediatric multivitamin-iron (POLY-VI-SOL with IRON) 15 MG chewable tablet, Chew 1 tablet daily, Disp: , Rfl:     Current Allergies     Allergies as of 12/22/2022 - Reviewed 12/22/2022   Allergen Reaction Noted   • Cherry flavor - food allergy  10/23/2020   • Latex Other (See Comments) 02/26/2022   • Other  10/23/2020   • Tea tree oil  12/13/2020            The following portions of the patient's history were reviewed and updated as appropriate: allergies, current medications, past family history, past medical history, past social history, past surgical history and problem list      Past Medical History:   Diagnosis Date   • Palpitations        History reviewed  No pertinent surgical history  No family history on file  Medications have been verified  Objective   /67   Pulse 93   Temp 98 7 °F (37 1 °C) (Temporal)   Resp 20   LMP 12/12/2022 (Approximate)   SpO2 97%        Physical Exam     Physical Exam  Vitals and nursing note reviewed  Constitutional:       General: She is not in acute distress  Appearance: Normal appearance  She is not ill-appearing, toxic-appearing or diaphoretic  Interventions: She is not intubated  HENT:      Head: Normocephalic and atraumatic  Right Ear: Tympanic membrane, ear canal and external ear normal  There is no impacted cerumen  Left Ear: Tympanic membrane, ear canal and external ear normal  There is no impacted cerumen  Nose: Nose normal  No congestion or rhinorrhea  Mouth/Throat:      Mouth: Mucous membranes are moist       Pharynx: Oropharynx is clear  Uvula midline  No pharyngeal swelling, oropharyngeal exudate, posterior oropharyngeal erythema or uvula swelling  Tonsils: No tonsillar exudate or tonsillar abscesses  1+ on the right  1+ on the left  Eyes:      Extraocular Movements: Extraocular movements intact  Conjunctiva/sclera: Conjunctivae normal       Pupils: Pupils are equal, round, and reactive to light  Cardiovascular:      Rate and Rhythm: Normal rate and regular rhythm  Pulses: Normal pulses        Heart sounds: Normal heart sounds, S1 normal and S2 normal  Heart sounds not distant  No murmur heard  No friction rub  No gallop  Pulmonary:      Effort: Pulmonary effort is normal  No tachypnea, bradypnea, accessory muscle usage, prolonged expiration, respiratory distress or retractions  She is not intubated  Breath sounds: Normal breath sounds  No stridor, decreased air movement or transmitted upper airway sounds  No decreased breath sounds, wheezing, rhonchi or rales  Abdominal:      General: Bowel sounds are normal       Palpations: Abdomen is soft  Tenderness: There is no abdominal tenderness  There is no guarding or rebound  Musculoskeletal:         General: Normal range of motion  Cervical back: Normal range of motion and neck supple  No rigidity or tenderness  Lymphadenopathy:      Cervical: No cervical adenopathy  Skin:     General: Skin is warm and dry  Capillary Refill: Capillary refill takes less than 2 seconds  Neurological:      General: No focal deficit present  Mental Status: She is alert and oriented to person, place, and time  Cranial Nerves: No cranial nerve deficit     Psychiatric:         Mood and Affect: Mood normal          Behavior: Behavior normal

## 2023-01-05 ENCOUNTER — OFFICE VISIT (OUTPATIENT)
Dept: URGENT CARE | Age: 23
End: 2023-01-05

## 2023-01-05 VITALS
DIASTOLIC BLOOD PRESSURE: 72 MMHG | RESPIRATION RATE: 20 BRPM | HEART RATE: 89 BPM | OXYGEN SATURATION: 97 % | SYSTOLIC BLOOD PRESSURE: 126 MMHG | TEMPERATURE: 97.2 F

## 2023-01-05 DIAGNOSIS — J02.8 SORE THROAT (VIRAL): Primary | ICD-10-CM

## 2023-01-05 DIAGNOSIS — B97.89 SORE THROAT (VIRAL): Primary | ICD-10-CM

## 2023-01-05 DIAGNOSIS — J06.9 ACUTE URI: ICD-10-CM

## 2023-01-05 RX ORDER — PREDNISONE 20 MG/1
40 TABLET ORAL DAILY
Qty: 10 TABLET | Refills: 0 | Status: SHIPPED | OUTPATIENT
Start: 2023-01-05

## 2023-01-06 LAB — S PYO AG THROAT QL: NEGATIVE

## 2023-01-06 NOTE — PROGRESS NOTES
[facility]        NAME: Vinh Aguilera is a 20 y o  female  : 2000    MRN: [id number]  DATE: 2023  TIME: 9:11 AM    Assessment and Plan   Sore throat (viral) [J02 8, B97 89]  1  Sore throat (viral)  POCT rapid strepA    Throat culture      2  Acute URI  predniSONE 20 mg tablet            Patient Instructions   Rapid strep: negative  Prednisone daily for 5 days  Take with food  Do not take ibuprofen, ketorolac, or naproxen while taking prednisone  Tylenol as needed for pain/fever   Increase fluid intake   Throat lozenges, honey, salt water gargles for throat discomfort   Follow up with your PCP for worsening or concerning symptoms      Follow up with PCP in 3-5 days  Proceed to  ER if symptoms worsen  Chief Complaint     Chief Complaint   Patient presents with   • Cough   • Sore Throat     Cough and sore throat since 2 weeks   Not getting any better since last time  was here  History of Present Illness       Patient is a 23-year-old female presenting with 2 weeks of cough, congestion, "log sensation" in her throat, and difficulty breathing  She is unable to further elaborate what difficulty breathing means to her  She states that she has to splash water in her face in the morning to restart her breathing  Denies fever or chills  She has been tested multiple times for COVID over the past 2 weeks  All of which were negative  She is using a humidifier at night with no relief  No over-the-counter medications as she "does not know what to take"  Review of Systems   Review of Systems   Constitutional: Negative for activity change, chills and fever  HENT: Positive for congestion and sore throat  Respiratory: Positive for cough and shortness of breath  Gastrointestinal: Negative for diarrhea, nausea and vomiting           Current Medications       Current Outpatient Medications:   •  predniSONE 20 mg tablet, Take 2 tablets (40 mg total) by mouth daily, Disp: 10 tablet, Rfl: 0  •  Acetaminophen Extra Strength 500 MG tablet, TAKE ONE TABLET BY MOUTH EVERY 6 HOURS AS NEEDED FOR MILD PAIN (PAIN SCORE 1-3), Disp: , Rfl:   •  bacitracin topical ointment 500 units/g topical ointment, Apply 1 large application topically 2 (two) times a day, Disp: 28 g, Rfl: 0  •  brompheniramine-pseudoephedrine-DM 30-2-10 MG/5ML syrup, Take 5 mL by mouth 4 (four) times a day as needed for congestion, cough or allergies, Disp: 120 mL, Rfl: 0  •  cetirizine (ZyrTEC) 10 mg tablet, Take 10 mg by mouth daily, Disp: , Rfl:   •  dicyclomine (BENTYL) 20 mg tablet, Take 1 tablet (20 mg total) by mouth 2 (two) times a day as needed (Abdominal pain, diarrhea), Disp: 20 tablet, Rfl: 0  •  ferrous sulfate 325 (65 Fe) mg tablet, Take 325 mg by mouth daily with breakfast, Disp: , Rfl:   •  ibuprofen (MOTRIN) 600 mg tablet, TAKE ONE TABLET BY MOUTH EVERY 6 HOURS AS NEEDED FOR MILD PAIN (PAIN SCORE 1-3), Disp: , Rfl:   •  ketorolac (TORADOL) 10 mg tablet, Take 1 tablet (10 mg total) by mouth every 6 (six) hours as needed for moderate pain or severe pain, Disp: 20 tablet, Rfl: 0  •  Multiple Vitamin (multivitamin) capsule, Take 1 capsule by mouth daily, Disp: , Rfl:   •  naproxen (NAPROSYN) 500 mg tablet, Take 1 tablet (500 mg total) by mouth 2 (two) times a day with meals, Disp: 30 tablet, Rfl: 0  •  ondansetron (ZOFRAN-ODT) 4 mg disintegrating tablet, Take 1 tablet (4 mg total) by mouth every 6 (six) hours as needed for nausea or vomiting, Disp: 20 tablet, Rfl: 0  •  pediatric multivitamin-iron (POLY-VI-SOL with IRON) 15 MG chewable tablet, Chew 1 tablet daily, Disp: , Rfl:     Current Allergies     Allergies as of 01/05/2023 - Reviewed 01/05/2023   Allergen Reaction Noted   • Cherry flavor - food allergy  10/23/2020   • Latex Other (See Comments) 02/26/2022   • Other  10/23/2020   • Tea tree oil  12/13/2020            The following portions of the patient's history were reviewed and updated as appropriate: allergies, current medications, past family history, past medical history, past social history, past surgical history and problem list      Past Medical History:   Diagnosis Date   • Palpitations        History reviewed  No pertinent surgical history  No family history on file  Medications have been verified  Objective   /72   Pulse 89   Temp (!) 97 2 °F (36 2 °C) (Temporal)   Resp 20   LMP 12/12/2022 (Approximate)   SpO2 97%      Rapid strep: Negative  Physical Exam     Physical Exam  Vitals reviewed  Constitutional:       General: She is awake  She is not in acute distress  Appearance: Normal appearance  She is not ill-appearing  HENT:      Head: Normocephalic  Right Ear: Hearing, tympanic membrane, ear canal and external ear normal       Left Ear: Hearing, tympanic membrane, ear canal and external ear normal       Nose: Nose normal       Mouth/Throat:      Lips: Pink  Pharynx: Pharyngeal swelling and posterior oropharyngeal erythema present  Cardiovascular:      Rate and Rhythm: Normal rate and regular rhythm  Heart sounds: Normal heart sounds, S1 normal and S2 normal    Pulmonary:      Effort: Pulmonary effort is normal       Breath sounds: Normal breath sounds  No decreased breath sounds, wheezing, rhonchi or rales  Skin:     General: Skin is warm and moist    Neurological:      General: No focal deficit present  Mental Status: She is alert and oriented to person, place, and time  Psychiatric:         Behavior: Behavior is cooperative

## 2023-01-06 NOTE — PATIENT INSTRUCTIONS
Rapid strep: negative  Prednisone daily for 5 days  Take with food  Do not take ibuprofen, ketorolac, or naproxen while taking prednisone  Tylenol as needed for pain/fever   Increase fluid intake   Throat lozenges, honey, salt water gargles for throat discomfort   Follow up with your PCP for worsening or concerning symptoms    Upper Respiratory Infection   WHAT YOU NEED TO KNOW:   An upper respiratory infection is also called a cold  It can affect your nose, throat, ears, and sinuses  Cold symptoms are usually worst for the first 3 to 5 days  Most people get better in 7 to 14 days  You may continue to cough for 2 to 3 weeks  Colds are caused by viruses and do not get better with antibiotics  DISCHARGE INSTRUCTIONS:   Call your local emergency number (911 in the [address] [facility] [facility]) if:   You have chest pain or trouble breathing  Return to the emergency department if:   You have a fever over 102ºF (39ºC)  Call your doctor if:   You have a low fever  Your sore throat gets worse or you see white or yellow spots in your throat  Your symptoms get worse after 3 to 5 days or are not better in 14 days  You have a rash anywhere on your skin  You have large, tender lumps in your neck  You have thick, green, or yellow drainage from your nose  You cough up thick yellow, green, or bloody mucus  You have a bad earache  You have questions or concerns about your condition or care  Medicines: You may need any of the following:  Decongestants  help reduce nasal congestion and help you breathe more easily  If you take decongestant pills, they may make you feel restless or cause problems with your sleep  Do not use decongestant sprays for more than a few days  Cough suppressants  help reduce coughing  Ask your healthcare provider which type of cough medicine is best for you  NSAIDs , such as ibuprofen, help decrease swelling, pain, and fever   NSAIDs can cause stomach bleeding or kidney problems in certain people  If you take blood thinner medicine, always ask your healthcare provider if NSAIDs are safe for you  Always read the medicine label and follow directions  Acetaminophen  decreases pain and fever  It is available without a doctor's order  Ask how much to take and how often to take it  Follow directions  Read the labels of all other medicines you are using to see if they also contain acetaminophen, or ask your doctor or pharmacist  Acetaminophen can cause liver damage if not taken correctly  Do not use more than 4 grams (4,000 milligrams) total of acetaminophen in one day  Take your medicine as directed  Contact your healthcare provider if you think your medicine is not helping or if you have side effects  Tell him or her if you are allergic to any medicine  Keep a list of the medicines, vitamins, and herbs you take  Include the amounts, and when and why you take them  Bring the list or the pill bottles to follow-up visits  Carry your medicine list with you in case of an emergency  Self-care:   Rest as much as possible  Slowly start to do more each day  Drink more liquids as directed  Liquids will help thin and loosen mucus so you can cough it up  Liquids will also help prevent dehydration  Liquids that help prevent dehydration include water, fruit juice, and broth  Do not drink liquids that contain caffeine  Caffeine can increase your risk for dehydration  Ask your healthcare provider how much liquid to drink each day  Soothe a sore throat  Gargle with warm salt water  Make salt water by dissolving ¼ teaspoon salt in 1 cup warm water  You may also suck on hard candy or throat lozenges  You may use a sore throat spray  Use a humidifier or vaporizer  Use a cool mist humidifier or a vaporizer to increase air moisture in your home  This may make it easier for you to breathe and help decrease your cough  Use saline nasal drops as directed  These help relieve congestion      Apply petroleum-based jelly around the outside of your nostrils  This can decrease irritation from blowing your nose  Do not smoke  Nicotine and other chemicals in cigarettes and cigars can make your symptoms worse  They can also cause infections such as bronchitis or pneumonia  Ask your healthcare provider for information if you currently smoke and need help to quit  E-cigarettes or smokeless tobacco still contain nicotine  Talk to your healthcare provider before you use these products  Prevent a cold: Wash your hands often  Use soap and water every time you wash your hands  Rub your soapy hands together, lacing your fingers  Use the fingers of one hand to scrub under the nails of the other hand  Wash for at least 20 seconds  Rinse with warm, running water for several seconds  Then dry your hands  Use germ-killing gel if soap and water are not available  Do not touch your eyes or mouth without washing your hands first          Cover a sneeze or cough  Use a tissue that covers your mouth and nose  Put the used tissue in the trash right away  Use the bend of your arm if a tissue is not available  Wash your hands well with soap and water or use a hand   Do not stand close to anyone who is sneezing or coughing  Try to stay away from others while you are sick  This is especially important during the first 2 to 3 days when the virus is more easily spread  Wait until a fever, cough, or other symptoms are gone before you return to work or other regular activities  Do not share items while you are sick  This includes food, drinks, eating utensils, and dishes  Follow up with your doctor as directed:  Write down your questions so you remember to ask them during your visits  © Copyright [employer] 2022 Information is for End User's use only and may not be sold, redistributed or otherwise used for commercial purposes   All illustrations and images included in CareNotes® are the copyrighted property of [employer]  or [facility] Rowena Quintanilla   The above information is an  only  It is not intended as medical advice for individual conditions or treatments  Talk to your doctor, nurse or pharmacist before following any medical regimen to see if it is safe and effective for you

## 2023-01-16 ENCOUNTER — APPOINTMENT (OUTPATIENT)
Dept: RADIOLOGY | Age: 23
End: 2023-01-16

## 2023-01-16 ENCOUNTER — OFFICE VISIT (OUTPATIENT)
Dept: URGENT CARE | Age: 23
End: 2023-01-16

## 2023-01-16 VITALS
HEART RATE: 82 BPM | HEIGHT: 56 IN | BODY MASS INDEX: 35.09 KG/M2 | DIASTOLIC BLOOD PRESSURE: 77 MMHG | SYSTOLIC BLOOD PRESSURE: 122 MMHG | WEIGHT: 156 LBS | OXYGEN SATURATION: 99 % | TEMPERATURE: 98.5 F | RESPIRATION RATE: 16 BRPM

## 2023-01-16 DIAGNOSIS — R05.3 CHRONIC COUGH: ICD-10-CM

## 2023-01-16 DIAGNOSIS — R05.3 CHRONIC COUGH: Primary | ICD-10-CM

## 2023-01-16 DIAGNOSIS — R06.02 SHORTNESS OF BREATH: ICD-10-CM

## 2023-01-16 RX ORDER — LEVALBUTEROL TARTRATE 45 UG/1
1-2 AEROSOL, METERED ORAL EVERY 4 HOURS PRN
Qty: 15 G | Refills: 0 | Status: SHIPPED | OUTPATIENT
Start: 2023-01-16

## 2023-01-16 RX ORDER — LEVALBUTEROL INHALATION SOLUTION 0.63 MG/3ML
0.63 SOLUTION RESPIRATORY (INHALATION) ONCE
Status: COMPLETED | OUTPATIENT
Start: 2023-01-16 | End: 2023-01-16

## 2023-01-16 RX ORDER — AZITHROMYCIN 250 MG/1
TABLET, FILM COATED ORAL
Qty: 6 TABLET | Refills: 0 | Status: SHIPPED | OUTPATIENT
Start: 2023-01-16 | End: 2023-01-20

## 2023-01-16 RX ORDER — BENZONATATE 200 MG/1
200 CAPSULE ORAL 3 TIMES DAILY PRN
Qty: 20 CAPSULE | Refills: 0 | Status: SHIPPED | OUTPATIENT
Start: 2023-01-16

## 2023-01-16 RX ADMIN — LEVALBUTEROL INHALATION SOLUTION 0.63 MG: 0.63 SOLUTION RESPIRATORY (INHALATION) at 19:52

## 2023-01-17 NOTE — PROGRESS NOTES
[facility]        NAME: Anitha Villarreal is a 20 y o  female  : 2000    MRN: [id number]  DATE: 2023  TIME: 8:00 PM    Assessment and Plan   Chronic cough [R05 3]  1  Chronic cough  XR chest pa & lateral    azithromycin (ZITHROMAX) 250 mg tablet    benzonatate (TESSALON) 200 MG capsule      2  Shortness of breath  levalbuterol (XOPENEX) inhalation solution 0 63 mg    levalbuterol (Xopenex HFA) 45 mcg/act inhaler            Patient Instructions     Take meds as prescribed  Xopenex nebulizer administered at the clinic  Reported some improvement in breathing  Follow up with PCP in 3-5 days  Proceed to  ER if symptoms worsen  Chief Complaint     Chief Complaint   Patient presents with   • Breathing Problem     For the past weeks patient has been having breathing issues  Denies using any medications  History of Present Illness       HPI   Reports cough and congestion for about 1 month  Previously treated with prednisone 40 mg daily but she states she was having a lot of side effects from the med (jittery, feeling high, decreased sleep, palpitations)  She stopped after a few days (about 4 days)  States now having some SOB  Tightness in the chest  No Hx of asthma  Previous use of marijuana  Last used in Oct 2022  Review of Systems   Review of Systems   Constitutional: Negative for fever  HENT: Negative for sore throat  Respiratory: Positive for cough, chest tightness and shortness of breath  Negative for wheezing  Cardiovascular: Negative for chest pain  Gastrointestinal: Negative for diarrhea and vomiting  Neurological: Negative for light-headedness           Current Medications       Current Outpatient Medications:   •  azithromycin (ZITHROMAX) 250 mg tablet, Take 2 tablets today then 1 tablet daily x 4 days, Disp: 6 tablet, Rfl: 0  •  benzonatate (TESSALON) 200 MG capsule, Take 1 capsule (200 mg total) by mouth 3 (three) times a day as needed for cough, Disp: 20 capsule, Rfl: 0  •  ferrous sulfate 325 (65 Fe) mg tablet, Take 325 mg by mouth daily with breakfast, Disp: , Rfl:   •  levalbuterol (Xopenex HFA) 45 mcg/act inhaler, Inhale 1-2 puffs every 4 (four) hours as needed for shortness of breath, Disp: 15 g, Rfl: 0  •  Multiple Vitamin (multivitamin) capsule, Take 1 capsule by mouth daily, Disp: , Rfl:   •  Acetaminophen Extra Strength 500 MG tablet, TAKE ONE TABLET BY MOUTH EVERY 6 HOURS AS NEEDED FOR MILD PAIN (PAIN SCORE 1-3) (Patient not taking: Reported on 1/16/2023), Disp: , Rfl:   •  bacitracin topical ointment 500 units/g topical ointment, Apply 1 large application topically 2 (two) times a day (Patient not taking: Reported on 1/16/2023), Disp: 28 g, Rfl: 0  •  brompheniramine-pseudoephedrine-DM 30-2-10 MG/5ML syrup, Take 5 mL by mouth 4 (four) times a day as needed for congestion, cough or allergies (Patient not taking: Reported on 1/16/2023), Disp: 120 mL, Rfl: 0  •  cetirizine (ZyrTEC) 10 mg tablet, Take 10 mg by mouth daily (Patient not taking: Reported on 1/16/2023), Disp: , Rfl:   •  dicyclomine (BENTYL) 20 mg tablet, Take 1 tablet (20 mg total) by mouth 2 (two) times a day as needed (Abdominal pain, diarrhea) (Patient not taking: Reported on 1/16/2023), Disp: 20 tablet, Rfl: 0  •  ibuprofen (MOTRIN) 600 mg tablet, TAKE ONE TABLET BY MOUTH EVERY 6 HOURS AS NEEDED FOR MILD PAIN (PAIN SCORE 1-3) (Patient not taking: Reported on 1/16/2023), Disp: , Rfl:   •  ketorolac (TORADOL) 10 mg tablet, Take 1 tablet (10 mg total) by mouth every 6 (six) hours as needed for moderate pain or severe pain (Patient not taking: Reported on 1/16/2023), Disp: 20 tablet, Rfl: 0  •  naproxen (NAPROSYN) 500 mg tablet, Take 1 tablet (500 mg total) by mouth 2 (two) times a day with meals (Patient not taking: Reported on 1/16/2023), Disp: 30 tablet, Rfl: 0  •  ondansetron (ZOFRAN-ODT) 4 mg disintegrating tablet, Take 1 tablet (4 mg total) by mouth every 6 (six) hours as needed for nausea or vomiting (Patient not taking: Reported on 1/16/2023), Disp: 20 tablet, Rfl: 0  •  pediatric multivitamin-iron (POLY-VI-SOL with IRON) 15 MG chewable tablet, Chew 1 tablet daily (Patient not taking: Reported on 1/16/2023), Disp: , Rfl:   •  predniSONE 20 mg tablet, Take 2 tablets (40 mg total) by mouth daily (Patient not taking: Reported on 1/16/2023), Disp: 10 tablet, Rfl: 0  No current facility-administered medications for this visit  Current Allergies     Allergies as of 01/16/2023 - Reviewed 01/16/2023   Allergen Reaction Noted   • Cherry flavor - food allergy  10/23/2020   • Latex Other (See Comments) 02/26/2022   • Other  10/23/2020   • Tea tree oil  12/13/2020            The following portions of the patient's history were reviewed and updated as appropriate: allergies, current medications, past family history, past medical history, past social history, past surgical history and problem list      Past Medical History:   Diagnosis Date   • Palpitations        No past surgical history on file  No family history on file  Medications have been verified  Objective   /77   Pulse 82   Temp 98 5 °F (36 9 °C) (Oral)   Resp 16   Ht 4' 8" (1 422 m)   Wt 70 8 kg (156 lb)   SpO2 99%   BMI 34 97 kg/m²   No LMP recorded  Physical Exam     Physical Exam  Constitutional:       Appearance: She is not ill-appearing or diaphoretic  HENT:      Right Ear: Tympanic membrane normal       Left Ear: Tympanic membrane normal       Nose: No rhinorrhea  Mouth/Throat:      Pharynx: No posterior oropharyngeal erythema  Comments: Post nasal drip  Cardiovascular:      Rate and Rhythm: Normal rate and regular rhythm  Heart sounds: Normal heart sounds  Pulmonary:      Effort: Pulmonary effort is normal       Breath sounds: Normal breath sounds  No wheezing

## 2023-02-24 ENCOUNTER — OFFICE VISIT (OUTPATIENT)
Dept: URGENT CARE | Age: 23
End: 2023-02-24

## 2023-02-24 DIAGNOSIS — H66.92 LEFT OTITIS MEDIA, UNSPECIFIED OTITIS MEDIA TYPE: Primary | ICD-10-CM

## 2023-02-24 RX ORDER — AMOXICILLIN AND CLAVULANATE POTASSIUM 875; 125 MG/1; MG/1
1 TABLET, FILM COATED ORAL EVERY 12 HOURS SCHEDULED
Qty: 14 TABLET | Refills: 0 | Status: SHIPPED | OUTPATIENT
Start: 2023-02-24 | End: 2023-03-03

## 2023-02-24 NOTE — PROGRESS NOTES
[facility]        NAME: Saige Toth is a 20 y o  female  : 2000    MRN: [id number]  DATE: 2023  TIME: 6:52 PM    Assessment and Plan   Left otitis media, unspecified otitis media type [H66 92]  1  Left otitis media, unspecified otitis media type  amoxicillin-clavulanate (AUGMENTIN) 875-125 mg per tablet            Patient Instructions       Follow up with PCP in 3-5 days  Proceed to  ER if symptoms worsen  Chief Complaint   No chief complaint on file  History of Present Illness       HPI  Patient presents today complaining of left-sided ear pain and sore throat ongoing for the past few days  Patient states she has difficulty swallowing  Patient thinks that her ear is infected    Denies any chest pain shortness of breath  Review of Systems   Review of Systems  Per HPI    Current Medications       Current Outpatient Medications:   •  amoxicillin-clavulanate (AUGMENTIN) 875-125 mg per tablet, Take 1 tablet by mouth every 12 (twelve) hours for 7 days, Disp: 14 tablet, Rfl: 0  •  Acetaminophen Extra Strength 500 MG tablet, TAKE ONE TABLET BY MOUTH EVERY 6 HOURS AS NEEDED FOR MILD PAIN (PAIN SCORE 1-3) (Patient not taking: Reported on 2023), Disp: , Rfl:   •  bacitracin topical ointment 500 units/g topical ointment, Apply 1 large application topically 2 (two) times a day (Patient not taking: Reported on 2023), Disp: 28 g, Rfl: 0  •  benzonatate (TESSALON) 200 MG capsule, Take 1 capsule (200 mg total) by mouth 3 (three) times a day as needed for cough, Disp: 20 capsule, Rfl: 0  •  brompheniramine-pseudoephedrine-DM 30-2-10 MG/5ML syrup, Take 5 mL by mouth 4 (four) times a day as needed for congestion, cough or allergies (Patient not taking: Reported on 2023), Disp: 120 mL, Rfl: 0  •  cetirizine (ZyrTEC) 10 mg tablet, Take 10 mg by mouth daily (Patient not taking: Reported on 2023), Disp: , Rfl:   •  dicyclomine (BENTYL) 20 mg tablet, Take 1 tablet (20 mg total) by mouth 2 (two) times a day as needed (Abdominal pain, diarrhea) (Patient not taking: Reported on 1/16/2023), Disp: 20 tablet, Rfl: 0  •  ferrous sulfate 325 (65 Fe) mg tablet, Take 325 mg by mouth daily with breakfast, Disp: , Rfl:   •  ibuprofen (MOTRIN) 600 mg tablet, TAKE ONE TABLET BY MOUTH EVERY 6 HOURS AS NEEDED FOR MILD PAIN (PAIN SCORE 1-3) (Patient not taking: Reported on 1/16/2023), Disp: , Rfl:   •  ketorolac (TORADOL) 10 mg tablet, Take 1 tablet (10 mg total) by mouth every 6 (six) hours as needed for moderate pain or severe pain (Patient not taking: Reported on 1/16/2023), Disp: 20 tablet, Rfl: 0  •  levalbuterol (Xopenex HFA) 45 mcg/act inhaler, Inhale 1-2 puffs every 4 (four) hours as needed for shortness of breath, Disp: 15 g, Rfl: 0  •  Multiple Vitamin (multivitamin) capsule, Take 1 capsule by mouth daily, Disp: , Rfl:   •  naproxen (NAPROSYN) 500 mg tablet, Take 1 tablet (500 mg total) by mouth 2 (two) times a day with meals (Patient not taking: Reported on 1/16/2023), Disp: 30 tablet, Rfl: 0  •  ondansetron (ZOFRAN-ODT) 4 mg disintegrating tablet, Take 1 tablet (4 mg total) by mouth every 6 (six) hours as needed for nausea or vomiting (Patient not taking: Reported on 1/16/2023), Disp: 20 tablet, Rfl: 0  •  pediatric multivitamin-iron (POLY-VI-SOL with IRON) 15 MG chewable tablet, Chew 1 tablet daily (Patient not taking: Reported on 1/16/2023), Disp: , Rfl:   •  predniSONE 20 mg tablet, Take 2 tablets (40 mg total) by mouth daily (Patient not taking: Reported on 1/16/2023), Disp: 10 tablet, Rfl: 0    Current Allergies     Allergies as of 02/24/2023 - Reviewed 01/16/2023   Allergen Reaction Noted   • Cherry flavor - food allergy  10/23/2020   • Latex Other (See Comments) 02/26/2022   • Other  10/23/2020   • Tea tree oil  12/13/2020            The following portions of the patient's history were reviewed and updated as appropriate: allergies, current medications, past family history, past medical history, past social history, past surgical history and problem list      Past Medical History:   Diagnosis Date   • Palpitations        No past surgical history on file  No family history on file  Medications have been verified  Objective   There were no vitals taken for this visit  No LMP recorded  Physical Exam     Physical Exam  Constitutional:       Appearance: She is well-developed  HENT:      Head: Normocephalic and atraumatic  Right Ear: Tympanic membrane normal       Ears:      Comments: L TM erythema      Nose: Congestion present  Mouth/Throat:      Pharynx: Posterior oropharyngeal erythema present  Eyes:      General:         Right eye: No discharge  Left eye: No discharge  Conjunctiva/sclera: Conjunctivae normal    Pulmonary:      Effort: Pulmonary effort is normal  No respiratory distress  Abdominal:      Palpations: Abdomen is soft  Tenderness: There is no abdominal tenderness  Musculoskeletal:      Cervical back: Normal range of motion  Skin:     Capillary Refill: Capillary refill takes less than 2 seconds  Findings: No erythema  Neurological:      Mental Status: She is alert and oriented to person, place, and time

## 2023-03-15 ENCOUNTER — OFFICE VISIT (OUTPATIENT)
Dept: URGENT CARE | Age: 23
End: 2023-03-15

## 2023-03-15 VITALS
SYSTOLIC BLOOD PRESSURE: 126 MMHG | TEMPERATURE: 99 F | RESPIRATION RATE: 12 BRPM | DIASTOLIC BLOOD PRESSURE: 67 MMHG | HEART RATE: 118 BPM | OXYGEN SATURATION: 98 %

## 2023-03-15 DIAGNOSIS — R10.30 LOWER ABDOMINAL PAIN: Primary | ICD-10-CM

## 2023-03-15 DIAGNOSIS — J06.9 UPPER RESPIRATORY TRACT INFECTION, UNSPECIFIED TYPE: ICD-10-CM

## 2023-03-15 DIAGNOSIS — R19.7 DIARRHEA, UNSPECIFIED TYPE: ICD-10-CM

## 2023-03-16 NOTE — PROGRESS NOTES
[address] [facility]        NAME: Marlys Rodriguez is a 20 y o  female  : 2000    MRN: [id number]  DATE: March 15, 2023  TIME: 8:28 PM    Assessment and Plan   Lower abdominal pain [R10 30]  1  Lower abdominal pain  Transfer to other facility      2  Upper respiratory tract infection, unspecified type        3  Diarrhea, unspecified type              Patient Instructions       Follow up with PCP in 3-5 days  Proceed to  ER if symptoms worsen  Chief Complaint     Chief Complaint   Patient presents with   • Sore Throat     Started yesterday   • Headache   • Earache     Left ear is popping   • Diarrhea   • Nasal Congestion     Can not breath out of nose; yellow mucus   • Rapid Heart Rate     Rapid heart rate alerted by smart watch   • Abdominal Pain     Sharp pelvic pain   • Leg Pain     Right leg feels like a brick         History of Present Illness       Patient here for evaluation of abdominal pain/pelvic pain and headache which started yesterday  Patient also with complaints of a sore throat and has been having episodes of diarrhea  She also has some nasal congestion and thick yellow mucus out of her nose  She states she had alert that her heart rate was fast today  Sore Throat   Associated symptoms include abdominal pain, congestion, diarrhea, ear pain, headaches and shortness of breath  Pertinent negatives include no coughing, ear discharge, trouble swallowing or vomiting  Headache  Earache   Associated symptoms include abdominal pain, diarrhea, headaches and a sore throat  Pertinent negatives include no coughing, ear discharge, rhinorrhea or vomiting  Diarrhea   Associated symptoms include abdominal pain and headaches  Pertinent negatives include no chills, coughing, fever or vomiting  Rapid Heart Rate   Associated symptoms include shortness of breath  Pertinent negatives include no chest pain, coughing, diaphoresis, fever, nausea or vomiting     Abdominal Pain  Associated symptoms include diarrhea and headaches  Pertinent negatives include no dysuria, fever, frequency, hematuria, nausea or vomiting  Leg Pain         Review of Systems   Review of Systems   Constitutional: Positive for activity change and appetite change  Negative for chills, diaphoresis, fatigue and fever  HENT: Positive for congestion, ear pain and sore throat  Negative for ear discharge, postnasal drip, rhinorrhea, sinus pressure, sinus pain and trouble swallowing  Eyes: Negative  Respiratory: Positive for shortness of breath  Negative for cough and wheezing  Cardiovascular: Positive for palpitations  Negative for chest pain and leg swelling  Gastrointestinal: Positive for abdominal pain and diarrhea  Negative for blood in stool, nausea and vomiting  Genitourinary: Positive for pelvic pain  Negative for difficulty urinating, dysuria, enuresis, flank pain, frequency, hematuria and urgency  Musculoskeletal: Negative  Skin: Negative  Neurological: Positive for headaches           Current Medications       Current Outpatient Medications:   •  Acetaminophen Extra Strength 500 MG tablet, TAKE ONE TABLET BY MOUTH EVERY 6 HOURS AS NEEDED FOR MILD PAIN (PAIN SCORE 1-3) (Patient not taking: Reported on 1/16/2023), Disp: , Rfl:   •  bacitracin topical ointment 500 units/g topical ointment, Apply 1 large application topically 2 (two) times a day (Patient not taking: Reported on 1/16/2023), Disp: 28 g, Rfl: 0  •  benzonatate (TESSALON) 200 MG capsule, Take 1 capsule (200 mg total) by mouth 3 (three) times a day as needed for cough, Disp: 20 capsule, Rfl: 0  •  brompheniramine-pseudoephedrine-DM 30-2-10 MG/5ML syrup, Take 5 mL by mouth 4 (four) times a day as needed for congestion, cough or allergies (Patient not taking: Reported on 1/16/2023), Disp: 120 mL, Rfl: 0  •  cetirizine (ZyrTEC) 10 mg tablet, Take 10 mg by mouth daily (Patient not taking: Reported on 1/16/2023), Disp: , Rfl:   •  dicyclomine (BENTYL) 20 mg tablet, Take 1 tablet (20 mg total) by mouth 2 (two) times a day as needed (Abdominal pain, diarrhea) (Patient not taking: Reported on 1/16/2023), Disp: 20 tablet, Rfl: 0  •  ferrous sulfate 325 (65 Fe) mg tablet, Take 325 mg by mouth daily with breakfast, Disp: , Rfl:   •  ibuprofen (MOTRIN) 600 mg tablet, TAKE ONE TABLET BY MOUTH EVERY 6 HOURS AS NEEDED FOR MILD PAIN (PAIN SCORE 1-3) (Patient not taking: Reported on 1/16/2023), Disp: , Rfl:   •  ketorolac (TORADOL) 10 mg tablet, Take 1 tablet (10 mg total) by mouth every 6 (six) hours as needed for moderate pain or severe pain (Patient not taking: Reported on 1/16/2023), Disp: 20 tablet, Rfl: 0  •  levalbuterol (Xopenex HFA) 45 mcg/act inhaler, Inhale 1-2 puffs every 4 (four) hours as needed for shortness of breath, Disp: 15 g, Rfl: 0  •  Multiple Vitamin (multivitamin) capsule, Take 1 capsule by mouth daily, Disp: , Rfl:   •  naproxen (NAPROSYN) 500 mg tablet, Take 1 tablet (500 mg total) by mouth 2 (two) times a day with meals (Patient not taking: Reported on 1/16/2023), Disp: 30 tablet, Rfl: 0  •  ondansetron (ZOFRAN-ODT) 4 mg disintegrating tablet, Take 1 tablet (4 mg total) by mouth every 6 (six) hours as needed for nausea or vomiting (Patient not taking: Reported on 1/16/2023), Disp: 20 tablet, Rfl: 0  •  pediatric multivitamin-iron (POLY-VI-SOL with IRON) 15 MG chewable tablet, Chew 1 tablet daily (Patient not taking: Reported on 1/16/2023), Disp: , Rfl:   •  predniSONE 20 mg tablet, Take 2 tablets (40 mg total) by mouth daily (Patient not taking: Reported on 1/16/2023), Disp: 10 tablet, Rfl: 0    Current Allergies     Allergies as of 03/15/2023 - Reviewed 03/15/2023   Allergen Reaction Noted   • Cherry flavor - food allergy  10/23/2020   • Latex Other (See Comments) 02/26/2022   • Other  10/23/2020   • Tea tree oil  12/13/2020            The following portions of the patient's history were reviewed and updated as appropriate: allergies, current medications, past family history, past medical history, past social history, past surgical history and problem list      Past Medical History:   Diagnosis Date   • Palpitations        No past surgical history on file  No family history on file  Medications have been verified  Objective   /67 (BP Location: Left arm, Patient Position: Sitting, Cuff Size: Large)   Pulse (!) 118   Temp 99 °F (37 2 °C) (Temporal)   Resp 12   LMP 02/26/2023   SpO2 98%   Patient's last menstrual period was 02/26/2023  Physical Exam     Physical Exam  Vitals and nursing note reviewed  Constitutional:       General: She is not in acute distress  Appearance: Normal appearance  She is well-developed  She is not ill-appearing, toxic-appearing or diaphoretic  HENT:      Head: Normocephalic and atraumatic  Right Ear: Ear canal normal  No tenderness  A middle ear effusion (Clear) is present  Tympanic membrane is not erythematous  Left Ear: Ear canal normal  No tenderness  A middle ear effusion (Clear ) is present  Tympanic membrane is not erythematous  Nose: Nose normal  No congestion or rhinorrhea  Mouth/Throat:      Mouth: Mucous membranes are moist       Pharynx: Uvula midline  No pharyngeal swelling, oropharyngeal exudate, posterior oropharyngeal erythema or uvula swelling  Tonsils: No tonsillar exudate or tonsillar abscesses  0 on the right  0 on the left  Eyes:      General:         Right eye: No discharge  Left eye: No discharge  Extraocular Movements: Extraocular movements intact  Conjunctiva/sclera: Conjunctivae normal       Pupils: Pupils are equal, round, and reactive to light  Cardiovascular:      Rate and Rhythm: Normal rate and regular rhythm  Heart sounds: Normal heart sounds  No murmur heard  Pulmonary:      Effort: Pulmonary effort is normal  No respiratory distress  Breath sounds: Normal breath sounds  No stridor   No wheezing, rhonchi or rales  Abdominal:      General: Bowel sounds are normal  There is no distension  Palpations: There is no mass  Tenderness: There is abdominal tenderness in the suprapubic area and left lower quadrant  There is guarding and rebound  Negative signs include Roberts's sign and Rovsing's sign  Lymphadenopathy:      Cervical: No cervical adenopathy  Skin:     General: Skin is warm and dry  Findings: No rash  Neurological:      General: No focal deficit present  Mental Status: She is alert and oriented to person, place, and time  Psychiatric:         Mood and Affect: Mood normal          Behavior: Behavior normal          Thought Content: Thought content normal          Judgment: Judgment normal        Discussed patient going to the emergency room now for further evaluation given the lower abdominal pain symptoms and findings on exam   Patient states that she does not have a way to get there  I did recommend calling an ambulance for transport as she states she is unable to pay for an [employer] and the bus does not Monday of this late at night  Patient refused  I complaint the patient if she does not go to the ER now and has worsening pain when she gets home she needs to call 911

## 2023-03-16 NOTE — PATIENT INSTRUCTIONS
1  Drink plenty fluids  2   Go to the Emergency Room for further evaluation tonight    3  Over-the-counter medications as needed for symptomatic care  4    Advance activities as tolerated  5    Follow-up with your primary care physician in 3-4 days  6   Go to emergency room if symptoms are worsening      7   Use a humidifier at bedtime

## 2023-08-14 ENCOUNTER — OFFICE VISIT (OUTPATIENT)
Dept: URGENT CARE | Age: 23
End: 2023-08-14
Payer: COMMERCIAL

## 2023-08-14 VITALS — RESPIRATION RATE: 18 BRPM | OXYGEN SATURATION: 99 % | HEART RATE: 88 BPM | TEMPERATURE: 97.4 F

## 2023-08-14 DIAGNOSIS — J02.9 SORE THROAT: Primary | ICD-10-CM

## 2023-08-14 DIAGNOSIS — K08.89 PAIN, DENTAL: ICD-10-CM

## 2023-08-14 LAB
S PYO AG THROAT QL: NEGATIVE
SARS-COV-2 AG UPPER RESP QL IA: NEGATIVE
VALID CONTROL: NORMAL

## 2023-08-14 PROCEDURE — 87147 CULTURE TYPE IMMUNOLOGIC: CPT

## 2023-08-14 PROCEDURE — 87811 SARS-COV-2 COVID19 W/OPTIC: CPT

## 2023-08-14 PROCEDURE — 99283 EMERGENCY DEPT VISIT LOW MDM: CPT

## 2023-08-14 PROCEDURE — G0382 LEV 3 HOSP TYPE B ED VISIT: HCPCS

## 2023-08-14 PROCEDURE — 87070 CULTURE OTHR SPECIMN AEROBIC: CPT

## 2023-08-14 RX ORDER — AMOXICILLIN 500 MG/1
500 CAPSULE ORAL EVERY 8 HOURS SCHEDULED
Qty: 30 CAPSULE | Refills: 0 | Status: SHIPPED | OUTPATIENT
Start: 2023-08-14 | End: 2023-08-24

## 2023-08-14 NOTE — PATIENT INSTRUCTIONS
Rapid strep performed in office found to be negative, throat culture results pending and should be available in [facility] within 48 hours. Rapid COVID performed in office found to be negative. May alternate Tylenol/ibuprofen as needed for fever. May use Cepacol lozenges, Chloraseptic throat spray, warm salt water gargles and hot tea with honey as needed for sore throat. Follow up with primary care provider if symptoms do not resolve within 1-2 weeks. Please begin antibiotics for empirical treatment of dental infection and follow up with dentist as soon as possible.

## 2023-08-14 NOTE — PROGRESS NOTES
[facility]        NAME: Roosevelt Calderon is a 20 y.o. female  : 2000    MRN: [id number]  DATE: 2023  TIME: 7:10 PM    Assessment and Plan   Sore throat [J02.9]  1. Sore throat  Poct Covid 19 Rapid Antigen Test    POCT rapid strepA    Throat culture      2. Pain, dental  amoxicillin (AMOXIL) 500 mg capsule      Rapid strep performed in office found to be negative, throat culture results pending and should be available in [facility] within 48 hours. Rapid COVID performed in office found to be negative. May alternate Tylenol/ibuprofen as needed for fever. May use Cepacol lozenges, Chloraseptic throat spray, warm salt water gargles and hot tea with honey as needed for sore throat. Follow up with primary care provider if symptoms do not resolve within 1-2 weeks. Please begin antibiotics for empirical treatment of dental infection and follow up with dentist as soon as possible. Patient Instructions   Upper Respiratory Infection   WHAT YOU NEED TO KNOW:   An upper respiratory infection is also called a cold. It can affect your nose, throat, ears, and sinuses. Cold symptoms are usually worst for the first 3 to 5 days. Most people get better in 7 to 14 days. You may continue to cough for 2 to 3 weeks. Colds are caused by viruses and do not get better with antibiotics. DISCHARGE INSTRUCTIONS:   Call your local emergency number (911 in the [address]) if:   • You have chest pain or trouble breathing.        Return to the emergency department if:   • You have a fever over 102ºF (39ºC).         Call your doctor if:   • You have a low fever.     • Your sore throat gets worse or you see white or yellow spots in your throat.     • Your symptoms get worse after 3 to 5 days or are not better in 14 days.     • You have a rash anywhere on your skin.     • You have large, tender lumps in your neck.     • You have thick, green, or yellow drainage from your nose.     • You cough up thick yellow, green, or bloody mucus.     • You have a bad earache.     • You have questions or concerns about your condition or care.     Medicines: You may need any of the following:  • Decongestants  help reduce nasal congestion and help you breathe more easily. If you take decongestant pills, they may make you feel restless or cause problems with your sleep. Do not use decongestant sprays for more than a few days.     • Cough suppressants  help reduce coughing. Ask your healthcare provider which type of cough medicine is best for you.      • NSAIDs , such as ibuprofen, help decrease swelling, pain, and fever. NSAIDs can cause stomach bleeding or kidney problems in certain people. If you take blood thinner medicine, always ask your healthcare provider if NSAIDs are safe for you. Always read the medicine label and follow directions.     • Acetaminophen  decreases pain and fever. It is available without a doctor's order. Ask how much to take and how often to take it. Follow directions. Read the labels of all other medicines you are using to see if they also contain acetaminophen, or ask your doctor or pharmacist. Acetaminophen can cause liver damage if not taken correctly.     • Take your medicine as directed. Contact your healthcare provider if you think your medicine is not helping or if you have side effects. Tell your provider if you are allergic to any medicine. Keep a list of the medicines, vitamins, and herbs you take. Include the amounts, and when and why you take them. Bring the list or the pill bottles to follow-up visits. Carry your medicine list with you in case of an emergency.     Self-care:   • Rest as much as possible. Slowly start to do more each day.     • Drink more liquids as directed. Liquids will help thin and loosen mucus so you can cough it up. Liquids will also help prevent dehydration. Liquids that help prevent dehydration include water, fruit juice, and broth.  Do not drink liquids that contain caffeine. Caffeine can increase your risk for dehydration. Ask your healthcare provider how much liquid to drink each day.     • Soothe a sore throat. Gargle with warm salt water. Make salt water by dissolving ¼ teaspoon salt in 1 cup warm water. You may also suck on hard candy or throat lozenges. You may use a sore throat spray.     • Use a humidifier or vaporizer. Use a cool mist humidifier or a vaporizer to increase air moisture in your home. This may make it easier for you to breathe and help decrease your cough.     • Use saline nasal drops as directed. These help relieve congestion.     • Apply petroleum-based jelly around the outside of your nostrils. This can decrease irritation from blowing your nose.     • Do not smoke. Nicotine and other chemicals in cigarettes and cigars can make your symptoms worse. They can also cause infections such as bronchitis or pneumonia. Ask your healthcare provider for information if you currently smoke and need help to quit. E-cigarettes or smokeless tobacco still contain nicotine. Talk to your healthcare provider before you use these products.     Prevent a cold:   • Wash your hands often. Use soap and water every time you wash your hands. Rub your soapy hands together, lacing your fingers. Use the fingers of one hand to scrub under the nails of the other hand. Wash for at least 20 seconds. Rinse with warm, running water for several seconds. Then dry your hands. Use hand  gel if soap and water are not available. Do not touch your eyes or mouth without washing your hands first.          • Cover a sneeze or cough. Use a tissue that covers your mouth and nose. Put the used tissue in the trash right away. Use the bend of your arm if a tissue is not available. Wash your hands well with soap and water or use a hand . Do not stand close to anyone who is sneezing or coughing.     • Try to stay away from others while you are sick.   This is especially important during the first 2 to 3 days when the virus is more easily spread. Wait until a fever, cough, or other symptoms are gone before you return to work or other regular activities.     • Do not share items while you are sick. This includes food, drinks, eating utensils, and dishes.     Follow up with your doctor as directed:  Write down your questions so you remember to ask them during your visits. © Copyright [employer] Stephenson 2022 Information is for End User's use only and may not be sold, redistributed or otherwise used for commercial purposes. The above information is an  only. It is not intended as medical advice for individual conditions or treatments. Talk to your doctor, nurse or pharmacist before following any medical regimen to see if it is safe and effective for you. Follow up with PCP in 3-5 days. Proceed to  ER if symptoms worsen. Chief Complaint     Chief Complaint   Patient presents with   • Flu Symptoms     STARTED WITH COUGH FEVER AND SORE THROAT OVER THE WEEKEND. .. WORSE YESTERDAY         History of Present Illness       20year old female with no significant PMH presents for evaluation of sore throat, cough and fever since yesterday. She reports a t.max of 104. She also notes posterior lower left molar pain which began about 1 week ago and wonders if this is related to her sore throat. She does not have a dentist. She denies chest pain, palpitations, SOB, N/V/D. Flu Symptoms  Associated symptoms include congestion, a sore throat and coughing. Pertinent negatives include no ear discharge, ear pain, eye discharge, eye itching, eye pain, eye redness, headaches, rhinorrhea, stridor, fatigue, fever, chest pain, shortness of breath, wheezing, diarrhea, nausea, vomiting, neck pain or rash. Review of Systems   Review of Systems   Constitutional: Negative for fatigue and fever. HENT: Positive for congestion, dental problem and sore throat.  Negative for ear discharge, ear pain, postnasal drip, rhinorrhea, sinus pressure, sinus pain and sneezing. Eyes: Negative. Negative for pain, discharge, redness and itching. Respiratory: Positive for cough. Negative for apnea, choking, chest tightness, shortness of breath, wheezing and stridor. Cardiovascular: Negative. Negative for chest pain and palpitations. Gastrointestinal: Negative. Negative for diarrhea, nausea and vomiting. Endocrine: Negative. Negative for polydipsia, polyphagia and polyuria. Genitourinary: Negative. Negative for decreased urine volume and flank pain. Musculoskeletal: Negative. Negative for arthralgias, back pain, myalgias, neck pain and neck stiffness. Skin: Negative. Negative for color change and rash. Allergic/Immunologic: Negative. Negative for environmental allergies. Neurological: Negative. Negative for dizziness, facial asymmetry, light-headedness, numbness and headaches. Hematological: Negative. Negative for adenopathy. Psychiatric/Behavioral: Negative.           Current Medications       Current Outpatient Medications:   •  Acetaminophen Extra Strength 500 MG tablet, , Disp: , Rfl:   •  amoxicillin (AMOXIL) 500 mg capsule, Take 1 capsule (500 mg total) by mouth every 8 (eight) hours for 10 days, Disp: 30 capsule, Rfl: 0  •  bacitracin topical ointment 500 units/g topical ointment, Apply 1 large application topically 2 (two) times a day (Patient not taking: Reported on 1/16/2023), Disp: 28 g, Rfl: 0  •  benzonatate (TESSALON) 200 MG capsule, Take 1 capsule (200 mg total) by mouth 3 (three) times a day as needed for cough (Patient not taking: Reported on 8/14/2023), Disp: 20 capsule, Rfl: 0  •  brompheniramine-pseudoephedrine-DM 30-2-10 MG/5ML syrup, Take 5 mL by mouth 4 (four) times a day as needed for congestion, cough or allergies (Patient not taking: Reported on 1/16/2023), Disp: 120 mL, Rfl: 0  •  cetirizine (ZyrTEC) 10 mg tablet, Take 10 mg by mouth daily (Patient not taking: Reported on 1/16/2023), Disp: , Rfl:   •  dicyclomine (BENTYL) 20 mg tablet, Take 1 tablet (20 mg total) by mouth 2 (two) times a day as needed (Abdominal pain, diarrhea) (Patient not taking: Reported on 1/16/2023), Disp: 20 tablet, Rfl: 0  •  ferrous sulfate 325 (65 Fe) mg tablet, Take 325 mg by mouth daily with breakfast (Patient not taking: Reported on 8/14/2023), Disp: , Rfl:   •  ibuprofen (MOTRIN) 600 mg tablet, TAKE ONE TABLET BY MOUTH EVERY 6 HOURS AS NEEDED FOR MILD PAIN (PAIN SCORE 1-3) (Patient not taking: Reported on 1/16/2023), Disp: , Rfl:   •  ketorolac (TORADOL) 10 mg tablet, Take 1 tablet (10 mg total) by mouth every 6 (six) hours as needed for moderate pain or severe pain (Patient not taking: Reported on 1/16/2023), Disp: 20 tablet, Rfl: 0  •  levalbuterol (Xopenex HFA) 45 mcg/act inhaler, Inhale 1-2 puffs every 4 (four) hours as needed for shortness of breath (Patient not taking: Reported on 8/14/2023), Disp: 15 g, Rfl: 0  •  Multiple Vitamin (multivitamin) capsule, Take 1 capsule by mouth daily (Patient not taking: Reported on 8/14/2023), Disp: , Rfl:   •  naproxen (NAPROSYN) 500 mg tablet, Take 1 tablet (500 mg total) by mouth 2 (two) times a day with meals (Patient not taking: Reported on 1/16/2023), Disp: 30 tablet, Rfl: 0  •  ondansetron (ZOFRAN-ODT) 4 mg disintegrating tablet, Take 1 tablet (4 mg total) by mouth every 6 (six) hours as needed for nausea or vomiting (Patient not taking: Reported on 1/16/2023), Disp: 20 tablet, Rfl: 0  •  pediatric multivitamin-iron (POLY-VI-SOL with IRON) 15 MG chewable tablet, Chew 1 tablet daily (Patient not taking: Reported on 1/16/2023), Disp: , Rfl:   •  predniSONE 20 mg tablet, Take 2 tablets (40 mg total) by mouth daily (Patient not taking: Reported on 1/16/2023), Disp: 10 tablet, Rfl: 0    Current Allergies     Allergies as of 08/14/2023 - Reviewed 08/14/2023   Allergen Reaction Noted   • Cherry flavor - food allergy  10/23/2020   • Latex Other (See Comments) 02/26/2022   • Other  10/23/2020   • Tea tree oil  12/13/2020            The following portions of the patient's history were reviewed and updated as appropriate: allergies, current medications, past family history, past medical history, past social history, past surgical history and problem list.     Past Medical History:   Diagnosis Date   • Palpitations        History reviewed. No pertinent surgical history. No family history on file. Medications have been verified. Objective   Pulse 88   Temp (!) 97.4 °F (36.3 °C)   Resp 18   LMP 07/24/2023   SpO2 99%        Physical Exam     Physical Exam  Vitals and nursing note reviewed. Constitutional:       General: She is not in acute distress. Appearance: Normal appearance. She is not ill-appearing, toxic-appearing or diaphoretic. Interventions: She is not intubated. HENT:      Head: Normocephalic and atraumatic. Right Ear: Tympanic membrane, ear canal and external ear normal. There is no impacted cerumen. Left Ear: Tympanic membrane, ear canal and external ear normal. There is no impacted cerumen. Nose: Nose normal. No congestion or rhinorrhea. Mouth/Throat:      Mouth: Mucous membranes are moist.      Dentition: Normal dentition. Does not have dentures. No gingival swelling, dental caries, dental abscesses or gum lesions. Tongue: No lesions. Tongue does not deviate from midline. Palate: No mass and lesions. Pharynx: Oropharynx is clear. Uvula midline. Posterior oropharyngeal erythema present. No pharyngeal swelling, oropharyngeal exudate or uvula swelling. Tonsils: No tonsillar exudate or tonsillar abscesses. 3+ on the right. 3+ on the left. Eyes:      Extraocular Movements: Extraocular movements intact. Conjunctiva/sclera: Conjunctivae normal.      Pupils: Pupils are equal, round, and reactive to light. Cardiovascular:      Rate and Rhythm: Normal rate and regular rhythm. Pulses: Normal pulses. Heart sounds: Normal heart sounds, S1 normal and S2 normal. Heart sounds not distant. No murmur heard. No friction rub. No gallop. Pulmonary:      Effort: Pulmonary effort is normal. No tachypnea, bradypnea, accessory muscle usage, prolonged expiration, respiratory distress or retractions. She is not intubated. Breath sounds: Normal breath sounds. No stridor, decreased air movement or transmitted upper airway sounds. No decreased breath sounds, wheezing, rhonchi or rales. Abdominal:      General: Bowel sounds are normal.      Palpations: Abdomen is soft. Tenderness: There is no abdominal tenderness. There is no guarding or rebound. Musculoskeletal:         General: Normal range of motion. Cervical back: Normal range of motion and neck supple. No tenderness. Skin:     General: Skin is warm and dry. Capillary Refill: Capillary refill takes less than 2 seconds. Neurological:      General: No focal deficit present. Mental Status: She is alert and oriented to person, place, and time. Cranial Nerves: No cranial nerve deficit.    Psychiatric:         Mood and Affect: Mood normal.         Behavior: Behavior normal.

## 2023-08-17 LAB — BACTERIA THROAT CULT: ABNORMAL

## 2023-08-21 ENCOUNTER — TELEPHONE (OUTPATIENT)
Dept: URGENT CARE | Age: 23
End: 2023-08-21

## 2023-12-22 ENCOUNTER — APPOINTMENT (OUTPATIENT)
Dept: URGENT CARE | Age: 23
End: 2023-12-22
Payer: COMMERCIAL

## 2023-12-22 ENCOUNTER — OFFICE VISIT (OUTPATIENT)
Dept: URGENT CARE | Age: 23
End: 2023-12-22
Payer: COMMERCIAL

## 2023-12-22 VITALS
DIASTOLIC BLOOD PRESSURE: 84 MMHG | TEMPERATURE: 96.5 F | OXYGEN SATURATION: 99 % | SYSTOLIC BLOOD PRESSURE: 132 MMHG | HEART RATE: 86 BPM | RESPIRATION RATE: 18 BRPM

## 2023-12-22 DIAGNOSIS — J01.90 ACUTE SINUSITIS, RECURRENCE NOT SPECIFIED, UNSPECIFIED LOCATION: Primary | ICD-10-CM

## 2023-12-22 PROCEDURE — G0382 LEV 3 HOSP TYPE B ED VISIT: HCPCS | Performed by: PHYSICIAN ASSISTANT

## 2023-12-22 PROCEDURE — S9083 URGENT CARE CENTER GLOBAL: HCPCS | Performed by: PHYSICIAN ASSISTANT

## 2023-12-22 RX ORDER — AMOXICILLIN 875 MG/1
875 TABLET, COATED ORAL 2 TIMES DAILY
Qty: 14 TABLET | Refills: 0 | Status: SHIPPED | OUTPATIENT
Start: 2023-12-22 | End: 2023-12-29

## 2023-12-23 NOTE — PATIENT INSTRUCTIONS
Amoxicillin as prescribed.  Take with food and a probiotic to prevent diarrhea and stomach upset is much as possible.  Sudafed as needed for congestion.  Recommend continued Mucinex DM as needed for cough take this regularly for better benefit.  Symptoms do not improve in 2 to 3 days follow-up with PCP.  If symptoms worsen or new symptoms develop report to the emergency room immediately.

## 2023-12-23 NOTE — PROGRESS NOTES
[facility]        NAME: Artem Lama is a 23 y.o. female  : 2000    MRN: [id number]  DATE: 2023  TIME: 7:19 PM    Assessment and Plan   Acute sinusitis, recurrence not specified, unspecified location [J01.90]  1. Acute sinusitis, recurrence not specified, unspecified location  amoxicillin (AMOXIL) 875 mg tablet      Presents with symptoms and examination consistent with possible bacterial sinus infection.  She be started on amoxicillin to treat as she is not 100% certain she is not pregnant.      Patient Instructions     Patient Instructions   Amoxicillin as prescribed.  Take with food and a probiotic to prevent diarrhea and stomach upset is much as possible.  Sudafed as needed for congestion.  Recommend continued Mucinex DM as needed for cough take this regularly for better benefit.  Symptoms do not improve in 2 to 3 days follow-up with PCP.  If symptoms worsen or new symptoms develop report to the emergency room immediately.    Follow up with PCP in 3-5 days.  Proceed to  ER if symptoms worsen.    Chief Complaint     Chief Complaint   Patient presents with    Cold Like Symptoms    Generalized Body Aches    Fatigue    Cough    Headache     Symptoms started one week ago           History of Present Illness       23-year-old female presents with complaint of headache, sinus pressure, runny nose, congestion, sore throat, cough, body aches, fatigue, and chills for the last week.  Patient denies fever, shortness of breath, and chest pain.    Generalized Body Aches  Associated symptoms include congestion, headaches, rhinorrhea, a sore throat, fatigue and coughing. Pertinent negatives include no fever, chest pain or shortness of breath.   Fatigue  Associated symptoms include chills, congestion, coughing, fatigue, headaches and a sore throat. Pertinent negatives include no chest pain or fever.   Cough  Associated symptoms include chills, headaches, postnasal drip, rhinorrhea and a  sore throat. Pertinent negatives include no chest pain, fever or shortness of breath.   Headache      Review of Systems   Review of Systems   Constitutional:  Positive for chills and fatigue. Negative for fever.   HENT:  Positive for congestion, postnasal drip, rhinorrhea, sinus pressure, sinus pain and sore throat.    Respiratory:  Positive for cough. Negative for shortness of breath.    Cardiovascular:  Negative for chest pain.   Neurological:  Positive for headaches.         Current Medications       Current Outpatient Medications:     Acetaminophen Extra Strength 500 MG tablet, , Disp: , Rfl:     amoxicillin (AMOXIL) 875 mg tablet, Take 1 tablet (875 mg total) by mouth 2 (two) times a day for 7 days, Disp: 14 tablet, Rfl: 0    bacitracin topical ointment 500 units/g topical ointment, Apply 1 large application topically 2 (two) times a day (Patient not taking: Reported on 1/16/2023), Disp: 28 g, Rfl: 0    benzonatate (TESSALON) 200 MG capsule, Take 1 capsule (200 mg total) by mouth 3 (three) times a day as needed for cough (Patient not taking: Reported on 8/14/2023), Disp: 20 capsule, Rfl: 0    brompheniramine-pseudoephedrine-DM 30-2-10 MG/5ML syrup, Take 5 mL by mouth 4 (four) times a day as needed for congestion, cough or allergies (Patient not taking: Reported on 1/16/2023), Disp: 120 mL, Rfl: 0    cetirizine (ZyrTEC) 10 mg tablet, Take 10 mg by mouth daily (Patient not taking: Reported on 1/16/2023), Disp: , Rfl:     dicyclomine (BENTYL) 20 mg tablet, Take 1 tablet (20 mg total) by mouth 2 (two) times a day as needed (Abdominal pain, diarrhea) (Patient not taking: Reported on 1/16/2023), Disp: 20 tablet, Rfl: 0    ferrous sulfate 325 (65 Fe) mg tablet, Take 325 mg by mouth daily with breakfast (Patient not taking: Reported on 8/14/2023), Disp: , Rfl:     ibuprofen (MOTRIN) 600 mg tablet, TAKE ONE TABLET BY MOUTH EVERY 6 HOURS AS NEEDED FOR MILD PAIN (PAIN SCORE 1-3) (Patient not taking: Reported on  1/16/2023), Disp: , Rfl:     ketorolac (TORADOL) 10 mg tablet, Take 1 tablet (10 mg total) by mouth every 6 (six) hours as needed for moderate pain or severe pain (Patient not taking: Reported on 1/16/2023), Disp: 20 tablet, Rfl: 0    levalbuterol (Xopenex HFA) 45 mcg/act inhaler, Inhale 1-2 puffs every 4 (four) hours as needed for shortness of breath (Patient not taking: Reported on 8/14/2023), Disp: 15 g, Rfl: 0    Multiple Vitamin (multivitamin) capsule, Take 1 capsule by mouth daily (Patient not taking: Reported on 8/14/2023), Disp: , Rfl:     naproxen (NAPROSYN) 500 mg tablet, Take 1 tablet (500 mg total) by mouth 2 (two) times a day with meals (Patient not taking: Reported on 1/16/2023), Disp: 30 tablet, Rfl: 0    ondansetron (ZOFRAN-ODT) 4 mg disintegrating tablet, Take 1 tablet (4 mg total) by mouth every 6 (six) hours as needed for nausea or vomiting (Patient not taking: Reported on 1/16/2023), Disp: 20 tablet, Rfl: 0    pediatric multivitamin-iron (POLY-VI-SOL with IRON) 15 MG chewable tablet, Chew 1 tablet daily (Patient not taking: Reported on 1/16/2023), Disp: , Rfl:     predniSONE 20 mg tablet, Take 2 tablets (40 mg total) by mouth daily (Patient not taking: Reported on 1/16/2023), Disp: 10 tablet, Rfl: 0    Current Allergies     Allergies as of 12/22/2023 - Reviewed 12/22/2023   Allergen Reaction Noted    Cherry flavor - food allergy  10/23/2020    Latex Other (See Comments) 02/26/2022    Other  10/23/2020    Tea tree oil  12/13/2020            The following portions of the patient's history were reviewed and updated as appropriate: allergies, current medications, past family history, past medical history, past social history, past surgical history and problem list.     Past Medical History:   Diagnosis Date    Palpitations        No past surgical history on file.    No family history on file.      Medications have been verified.        Objective   /84   Pulse 86   Temp (!) 96.5 °F (35.8 °C)    Resp 18   SpO2 99%   No LMP recorded.       Physical Exam     Physical Exam  Vitals and nursing note reviewed.   Constitutional:       General: She is not in acute distress.     Appearance: Normal appearance. She is not ill-appearing or toxic-appearing.   HENT:      Head: Normocephalic and atraumatic.      Jaw: No trismus.      Right Ear: Hearing, tympanic membrane, ear canal and external ear normal. There is no impacted cerumen. No foreign body.      Left Ear: Hearing, tympanic membrane, ear canal and external ear normal. There is no impacted cerumen. No foreign body.      Nose: Mucosal edema, congestion and rhinorrhea present. No nasal deformity. Rhinorrhea is purulent.      Right Nostril: No foreign body, epistaxis or occlusion.      Left Nostril: No foreign body, epistaxis or occlusion.      Right Turbinates: Not enlarged, swollen or pale.      Left Turbinates: Not enlarged, swollen or pale.      Right Sinus: Maxillary sinus tenderness present. No frontal sinus tenderness.      Left Sinus: No maxillary sinus tenderness or frontal sinus tenderness.      Mouth/Throat:      Lips: Pink. No lesions.      Mouth: Mucous membranes are moist. No injury, oral lesions or angioedema.      Dentition: Normal dentition.      Tongue: No lesions. Tongue does not deviate from midline.      Palate: No mass and lesions.      Pharynx: Uvula midline. Pharyngeal swelling and posterior oropharyngeal erythema present. No oropharyngeal exudate or uvula swelling.      Tonsils: No tonsillar exudate or tonsillar abscesses.      Comments: Mild swelling and erythema of the posterior oropharynx with postnasal drip present  Eyes:      General: Lids are normal. Gaze aligned appropriately. No allergic shiner.     Extraocular Movements: Extraocular movements intact.   Cardiovascular:      Rate and Rhythm: Normal rate and regular rhythm.      Heart sounds: Normal heart sounds, S1 normal and S2 normal. Heart sounds not distant. No murmur heard.    "  No friction rub. No gallop.   Pulmonary:      Effort: Pulmonary effort is normal.      Breath sounds: Normal breath sounds. No decreased breath sounds, wheezing, rhonchi or rales.      Comments: Patient speaking in full sentences with no increased respiratory effort.   No audible wheezing or stridor.   Lymphadenopathy:      Cervical: No cervical adenopathy.   Skin:     General: Skin is warm and dry.   Neurological:      Mental Status: She is alert and oriented to person, place, and time.      Coordination: Coordination is intact.      Gait: Gait is intact.   Psychiatric:         Attention and Perception: Attention and perception normal.         Mood and Affect: Mood and affect normal.         Speech: Speech normal.         Behavior: Behavior is cooperative.               Note: Portions of this record may have been created with voice recognition software. Occasional wrong word or \"sound a like\" substitutions may have occurred due to the inherent limitations of voice recognition software. Please read the chart carefully and recognize, using context, where substitutions have occurred.*      "

## 2023-12-27 ENCOUNTER — OFFICE VISIT (OUTPATIENT)
Dept: URGENT CARE | Age: 23
End: 2023-12-27
Payer: COMMERCIAL

## 2023-12-27 VITALS
HEART RATE: 81 BPM | RESPIRATION RATE: 18 BRPM | DIASTOLIC BLOOD PRESSURE: 72 MMHG | OXYGEN SATURATION: 99 % | TEMPERATURE: 98.1 F | SYSTOLIC BLOOD PRESSURE: 132 MMHG

## 2023-12-27 DIAGNOSIS — R10.33 PERIUMBILICAL ABDOMINAL PAIN: Primary | ICD-10-CM

## 2023-12-27 LAB — SL AMB POCT URINE HCG: NEGATIVE

## 2023-12-27 PROCEDURE — G0382 LEV 3 HOSP TYPE B ED VISIT: HCPCS | Performed by: PHYSICIAN ASSISTANT

## 2023-12-27 PROCEDURE — 81025 URINE PREGNANCY TEST: CPT | Performed by: PHYSICIAN ASSISTANT

## 2023-12-27 PROCEDURE — S9083 URGENT CARE CENTER GLOBAL: HCPCS | Performed by: PHYSICIAN ASSISTANT

## 2024-01-02 ENCOUNTER — EVALUATION (OUTPATIENT)
Dept: PHYSICAL THERAPY | Age: 24
End: 2024-01-02
Payer: COMMERCIAL

## 2024-01-02 DIAGNOSIS — M72.2 PLANTAR FASCIITIS: Primary | ICD-10-CM

## 2024-01-02 PROCEDURE — 97161 PT EVAL LOW COMPLEX 20 MIN: CPT

## 2024-01-02 PROCEDURE — 97140 MANUAL THERAPY 1/> REGIONS: CPT

## 2024-01-02 NOTE — PROGRESS NOTES
PT Evaluation     Today's date: 2024  Patient name: Artem Lama  : 2000  MRN: [id number]  Referring provider: Jose Lu DPM  Dx: No diagnosis found.               Assessment  Assessment details: Patient is 23 year old female presenting to PT for evaluation with c/c of plantar heel pain. Onset of symptoms 7 months ago. Patient denies numbness/tingling. Patient demonstrates deficits in intrinsic foot strength and ankle/first toe ROM. Signs/symptoms consistent with that of plantar fasciitis. Patient would benefit from skilled outpt PT to address deficits and improve abilities with all functional tasks.   Impairments: abnormal gait, abnormal or restricted ROM, lacks appropriate home exercise program and pain with function  Functional limitations: Difficulties with standing, ambulation, stair negotiation  Goals  STG (4 weeks):  1) Education: Patient to demonstrate compliance with attendance of therapy sessions and performance of introductory HEP.  2) Pain: Patient to report <5/10 pain during her day to day activities, allowing for improvements with standing and ambulation.    LTG (8 weeks):  1) Education: Patient to demonstrate compliance with attendance of therapy sessions and performance of progressive HEP, allowing for transition to self-management of symptoms.  2) Pain: Patient to report day to day pain levels <2/10, allowing for improvements with standing and ambulation tolerance.  3) ROM: Patient to demonstrate improved ankle ROM DF >10 degrees, allowing for improvements with all functional tasks.     Plan  Patient would benefit from: skilled physical therapy  Planned modality interventions: TENS, cryotherapy and thermotherapy: hydrocollator packs  Planned therapy interventions: IASTM, joint mobilization, manual therapy, kinesiology taping, massage, stretching, therapeutic activities, strengthening, therapeutic exercise, patient education, neuromuscular re-education, flexibility,  "functional ROM exercises, gait training and home exercise program  Frequency: 2x week  Duration in visits: 16        Subjective Evaluation    History of Present Illness  Mechanism of injury: Patient presents to therapy with c/c of L plantar foot pain, onset ~7 months after patient states she \"chaffed\" her Achilles region. Patient reports pain is in her heel region. Patient reports that standing primarily causes most of her pain, as well as walking. She denies numbness/tingling in her foot. She reports that she is not taking any type of pain medication. Patient reports that she has received two injections, with second injection in December providing her most relief. She received course of PT at [facility], but states her doctor had her stop and she was unsure why. States this was helping. 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   Patient Goals  Patient goals for therapy: decreased pain    Pain  At best pain rating: 3  At worst pain rating: 10  Location: Plantar fascia    Treatments  Previous treatment: injection treatment and physical therapy        Objective     Tenderness   Left Ankle/Foot   Tenderness in the plantar fascia.     Active Range of Motion   Left Ankle/Foot   Dorsiflexion (kf): 4 degrees   Plantar flexion: 50 degrees   Inversion: 30 degrees   Eversion: 20 degrees     Additional Active Range of Motion Details  Restricted ROM of great toe extension    Strength/Myotome Testing     Left Ankle/Foot   Dorsiflexion: 5  Plantar flexion: 5  Inversion: 5  Eversion: 5    Tests   Left Ankle/Foot   Negative for windlass.     Ambulation     Observational Gait     Additional Observational Gait Details  Antalgic gait w/ decreased stance time and push off L LE             Precautions: n/a      Manuals 01/02            IASTM L plantar fascia 10'                                                   Neuro Re-Ed                                                                                                "         Ther Ex                                                                                                                     Ther Activity                                       Gait Training                                       Modalities

## 2024-01-02 NOTE — LETTER
January 2, 2024      No Recipients    Patient: Artem Lmaa   YOB: 2000   Date of Visit: 1/2/2024     Encounter Diagnosis     ICD-10-CM    1. Plantar fasciitis  M72.2           Dear Dr. Lu:    Thank you for your recent referral of Artem Lama. Please review the attached evaluation summary from Artem's recent visit.     Please verify that you agree with the plan of care by signing the attached order.     If you have any questions or concerns, please do not hesitate to call.     I sincerely appreciate the opportunity to share in the care of one of your patients and hope to have another opportunity to work with you in the near future.       Sincerely,    Toney Lewis, PT      Referring Provider:      I certify that I have read the below Plan of Care and certify the need for these services furnished under this plan of treatment while under my care.                    Jose Lu, TRIXIE  [address] [place] [address]  Via Fax: [phone]          No notes on file

## 2024-03-04 ENCOUNTER — HOSPITAL ENCOUNTER (OUTPATIENT)
Dept: RADIOLOGY | Age: 24
Discharge: HOME | End: 2024-03-04
Payer: COMMERCIAL

## 2024-03-04 DIAGNOSIS — R16.0 HEPATOMEGALY: ICD-10-CM

## 2024-03-04 PROCEDURE — 76705 ECHO EXAM OF ABDOMEN: CPT

## 2024-04-08 ENCOUNTER — OFFICE VISIT (OUTPATIENT)
Dept: URGENT CARE | Age: 24
End: 2024-04-08

## 2024-04-08 VITALS
DIASTOLIC BLOOD PRESSURE: 61 MMHG | OXYGEN SATURATION: 100 % | SYSTOLIC BLOOD PRESSURE: 123 MMHG | HEART RATE: 76 BPM | RESPIRATION RATE: 14 BRPM | TEMPERATURE: 98.1 F

## 2024-04-08 DIAGNOSIS — Z91.199 NO-SHOW FOR APPOINTMENT: Primary | ICD-10-CM

## 2024-05-16 ENCOUNTER — OFFICE VISIT (OUTPATIENT)
Dept: URGENT CARE | Age: 24
End: 2024-05-16
Payer: COMMERCIAL

## 2024-05-16 VITALS
SYSTOLIC BLOOD PRESSURE: 121 MMHG | DIASTOLIC BLOOD PRESSURE: 76 MMHG | RESPIRATION RATE: 18 BRPM | TEMPERATURE: 97.6 F | OXYGEN SATURATION: 99 % | HEART RATE: 102 BPM

## 2024-05-16 DIAGNOSIS — H00.022 HORDEOLUM INTERNUM OF RIGHT LOWER EYELID: Primary | ICD-10-CM

## 2024-05-16 DIAGNOSIS — H69.93 ETD (EUSTACHIAN TUBE DYSFUNCTION), BILATERAL: ICD-10-CM

## 2024-05-16 DIAGNOSIS — R05.2 SUBACUTE COUGH: ICD-10-CM

## 2024-05-16 DIAGNOSIS — R09.82 PND (POST-NASAL DRIP): ICD-10-CM

## 2024-05-16 PROCEDURE — S9083 URGENT CARE CENTER GLOBAL: HCPCS

## 2024-05-16 PROCEDURE — G0383 LEV 4 HOSP TYPE B ED VISIT: HCPCS

## 2024-05-16 RX ORDER — FLUTICASONE PROPIONATE 50 MCG
1 SPRAY, SUSPENSION (ML) NASAL DAILY
Qty: 11.1 ML | Refills: 0 | Status: SHIPPED | OUTPATIENT
Start: 2024-05-16

## 2024-05-16 RX ORDER — ERYTHROMYCIN 5 MG/G
0.5 OINTMENT OPHTHALMIC EVERY 8 HOURS SCHEDULED
Qty: 15 G | Refills: 0 | Status: SHIPPED | OUTPATIENT
Start: 2024-05-16 | End: 2024-05-21

## 2024-05-16 RX ORDER — ALBUTEROL SULFATE 90 UG/1
2 AEROSOL, METERED RESPIRATORY (INHALATION) EVERY 6 HOURS PRN
Qty: 8.5 G | Refills: 0 | Status: SHIPPED | OUTPATIENT
Start: 2024-05-16

## 2024-05-16 RX ORDER — BENZONATATE 200 MG/1
200 CAPSULE ORAL 3 TIMES DAILY PRN
Qty: 20 CAPSULE | Refills: 0 | Status: SHIPPED | OUTPATIENT
Start: 2024-05-16

## 2024-05-16 NOTE — PROGRESS NOTES
[facility]        NAME: Artem Lama is a 23 y.o. female  : 2000    MRN: [id number]  DATE: May 16, 2024  TIME: 6:52 PM    Assessment and Plan   Hordeolum internum of right lower eyelid [H00.022]  1. Hordeolum internum of right lower eyelid  erythromycin (ILOTYCIN) ophthalmic ointment      2. Subacute cough  benzonatate (TESSALON) 200 MG capsule    albuterol (ProAir HFA) 90 mcg/act inhaler      3. PND (post-nasal drip)  fluticasone (FLONASE) 50 mcg/act nasal spray      4. ETD (Eustachian tube dysfunction), bilateral              Patient Instructions     Please trial Tessalon every 8 hours as needed for cough.   Please trial Albuterol every 6 hours as needed for chest tightness.   Trial Flonase daily.  Continue with OTC allergy medication.   Apply antibiotic eye ointment to right eye 3x daily for 5 days.   Drink plenty of fluids.    Follow up with PCP in 3-5 days.  Proceed to  ER if symptoms worsen.    If tests are performed, our office will contact you with results only if changes need to made to the care plan discussed with you at the visit. You can review your full results on [facility].    Chief Complaint     Chief Complaint   Patient presents with    Cold Like Symptoms     Since  patient has been having b/l ear pain and popping with coughing. She notes PND with nasal congestion. She has productive cough with a dry mouth. This past week patient woke up with a stye of the right eye and her eyes have been glossy and irritated.         History of Present Illness       23-year-old female presenting for multiple complaints including possible stye and upper respiratory symptoms.  Patient states over the past day she has been experiencing swelling to her right lower eye lid.  She denies any drainage or eye redness, but states that the area is tender.  She denies any current treatment for her symptoms.  She also states that over the past month she has been experiencing  congestion, ear pain, postnasal drip, runny nose, cough and chest tightness. She states that her ears feel full and notes that they are popping. She has been taking allergy medication with minimal relief of her symptoms.  She denies any fevers, chills, chest pain or shortness of breath.          Review of Systems   Review of Systems   Constitutional:  Negative for chills and fever.   HENT:  Positive for congestion, ear pain, postnasal drip and rhinorrhea. Negative for ear discharge and hearing loss.    Eyes:  Negative for discharge and redness.        Stye     Respiratory:  Positive for cough and chest tightness. Negative for shortness of breath.    Cardiovascular:  Negative for chest pain.   Gastrointestinal:  Positive for diarrhea. Negative for nausea and vomiting.   All other systems reviewed and are negative.        Current Medications       Current Outpatient Medications:     albuterol (ProAir HFA) 90 mcg/act inhaler, Inhale 2 puffs every 6 (six) hours as needed for wheezing, Disp: 8.5 g, Rfl: 0    benzonatate (TESSALON) 200 MG capsule, Take 1 capsule (200 mg total) by mouth 3 (three) times a day as needed for cough, Disp: 20 capsule, Rfl: 0    cetirizine (ZyrTEC) 10 mg tablet, Take 10 mg by mouth daily, Disp: , Rfl:     erythromycin (ILOTYCIN) ophthalmic ointment, Administer 0.5 inches to the right eye every 8 (eight) hours for 5 days, Disp: 15 g, Rfl: 0    fluticasone (FLONASE) 50 mcg/act nasal spray, 1 spray into each nostril daily, Disp: 11.1 mL, Rfl: 0    Acetaminophen Extra Strength 500 MG tablet, , Disp: , Rfl:     bacitracin topical ointment 500 units/g topical ointment, Apply 1 large application topically 2 (two) times a day (Patient not taking: Reported on 1/16/2023), Disp: 28 g, Rfl: 0    benzonatate (TESSALON) 200 MG capsule, Take 1 capsule (200 mg total) by mouth 3 (three) times a day as needed for cough (Patient not taking: Reported on 8/14/2023), Disp: 20 capsule, Rfl: 0     brompheniramine-pseudoephedrine-DM 30-2-10 MG/5ML syrup, Take 5 mL by mouth 4 (four) times a day as needed for congestion, cough or allergies (Patient not taking: Reported on 1/16/2023), Disp: 120 mL, Rfl: 0    dicyclomine (BENTYL) 20 mg tablet, Take 1 tablet (20 mg total) by mouth 2 (two) times a day as needed (Abdominal pain, diarrhea) (Patient not taking: Reported on 1/16/2023), Disp: 20 tablet, Rfl: 0    ferrous sulfate 325 (65 Fe) mg tablet, Take 325 mg by mouth daily with breakfast (Patient not taking: Reported on 8/14/2023), Disp: , Rfl:     ibuprofen (MOTRIN) 600 mg tablet, TAKE ONE TABLET BY MOUTH EVERY 6 HOURS AS NEEDED FOR MILD PAIN (PAIN SCORE 1-3) (Patient not taking: Reported on 1/16/2023), Disp: , Rfl:     ketorolac (TORADOL) 10 mg tablet, Take 1 tablet (10 mg total) by mouth every 6 (six) hours as needed for moderate pain or severe pain (Patient not taking: Reported on 1/16/2023), Disp: 20 tablet, Rfl: 0    levalbuterol (Xopenex HFA) 45 mcg/act inhaler, Inhale 1-2 puffs every 4 (four) hours as needed for shortness of breath (Patient not taking: Reported on 8/14/2023), Disp: 15 g, Rfl: 0    Multiple Vitamin (multivitamin) capsule, Take 1 capsule by mouth daily (Patient not taking: Reported on 8/14/2023), Disp: , Rfl:     naproxen (NAPROSYN) 500 mg tablet, Take 1 tablet (500 mg total) by mouth 2 (two) times a day with meals (Patient not taking: Reported on 1/16/2023), Disp: 30 tablet, Rfl: 0    ondansetron (ZOFRAN-ODT) 4 mg disintegrating tablet, Take 1 tablet (4 mg total) by mouth every 6 (six) hours as needed for nausea or vomiting (Patient not taking: Reported on 1/16/2023), Disp: 20 tablet, Rfl: 0    pediatric multivitamin-iron (POLY-VI-SOL with IRON) 15 MG chewable tablet, Chew 1 tablet daily (Patient not taking: Reported on 1/16/2023), Disp: , Rfl:     predniSONE 20 mg tablet, Take 2 tablets (40 mg total) by mouth daily (Patient not taking: Reported on 1/16/2023), Disp: 10 tablet, Rfl:  0    Current Allergies     Allergies as of 05/16/2024 - Reviewed 05/16/2024   Allergen Reaction Noted    Cherry flavor - food allergy  10/23/2020    Latex Other (See Comments) 02/26/2022    Other  10/23/2020    Tea tree oil  12/13/2020            The following portions of the patient's history were reviewed and updated as appropriate: allergies, current medications, past family history, past medical history, past social history, past surgical history and problem list.     Past Medical History:   Diagnosis Date    Palpitations        No past surgical history on file.    No family history on file.      Medications have been verified.        Objective   /76   Pulse 102   Temp 97.6 °F (36.4 °C)   Resp 18   SpO2 99%        Physical Exam     Physical Exam  Vitals and nursing note reviewed.   Constitutional:       General: She is not in acute distress.     Appearance: Normal appearance. She is normal weight. She is not ill-appearing, toxic-appearing or diaphoretic.   HENT:      Head: Normocephalic and atraumatic.      Right Ear: Tympanic membrane normal.      Left Ear: Tympanic membrane normal.      Ears:      Comments: Small amount of serous fluid posterior bilateral TM      Nose: Congestion present. No rhinorrhea.      Mouth/Throat:      Mouth: Mucous membranes are moist.      Pharynx: Oropharynx is clear. No oropharyngeal exudate or posterior oropharyngeal erythema.   Eyes:      General:         Right eye: No discharge.         Left eye: No discharge.      Comments: Hordeolum internum right lower eye lid      Cardiovascular:      Rate and Rhythm: Normal rate and regular rhythm.      Pulses: Normal pulses.      Heart sounds: Normal heart sounds. No murmur heard.     No friction rub. No gallop.   Pulmonary:      Effort: Pulmonary effort is normal. No respiratory distress.      Breath sounds: Normal breath sounds. No stridor. No wheezing, rhonchi or rales.   Chest:      Chest wall: No tenderness.   Abdominal:       General: Bowel sounds are normal.      Palpations: Abdomen is soft.      Tenderness: There is no abdominal tenderness.   Skin:     General: Skin is warm and dry.   Neurological:      Mental Status: She is alert.   Psychiatric:         Mood and Affect: Mood normal.         Behavior: Behavior normal.

## 2024-05-16 NOTE — PATIENT INSTRUCTIONS
Please trial Tessalon every 8 hours as needed for cough.   Please trial Albuterol every 6 hours as needed for chest tightness.   Trial Flonase daily.  Continue with OTC allergy medication.   Apply antibiotic eye ointment to right eye 3x daily for 5 days.   Drink plenty of fluids.

## 2024-06-02 ENCOUNTER — OFFICE VISIT (OUTPATIENT)
Dept: URGENT CARE | Age: 24
End: 2024-06-02
Payer: COMMERCIAL

## 2024-06-02 VITALS
TEMPERATURE: 100 F | RESPIRATION RATE: 16 BRPM | DIASTOLIC BLOOD PRESSURE: 82 MMHG | OXYGEN SATURATION: 99 % | HEART RATE: 113 BPM | SYSTOLIC BLOOD PRESSURE: 112 MMHG | BODY MASS INDEX: 35 KG/M2 | WEIGHT: 156.13 LBS

## 2024-06-02 DIAGNOSIS — J02.0 STREP PHARYNGITIS: Primary | ICD-10-CM

## 2024-06-02 DIAGNOSIS — J02.9 SORE THROAT: ICD-10-CM

## 2024-06-02 LAB — S PYO AG THROAT QL: POSITIVE

## 2024-06-02 PROCEDURE — 87880 STREP A ASSAY W/OPTIC: CPT

## 2024-06-02 PROCEDURE — 99213 OFFICE O/P EST LOW 20 MIN: CPT

## 2024-06-02 PROCEDURE — S9083 URGENT CARE CENTER GLOBAL: HCPCS

## 2024-06-02 RX ORDER — AMOXICILLIN 500 MG/1
500 CAPSULE ORAL EVERY 12 HOURS SCHEDULED
Qty: 20 CAPSULE | Refills: 0 | Status: SHIPPED | OUTPATIENT
Start: 2024-06-02 | End: 2024-06-12

## 2024-06-02 NOTE — LETTER
June 2, 2024     Patient: Artem Lama   YOB: 2000   Date of Visit: 6/2/2024       To Whom it May Concern:    Artem Lama was seen in my clinic on 6/2/2024. She may return to work on 6/4/2024 .    If you have any questions or concerns, please don't hesitate to call.         Sincerely,          KIARRA Diaz        CC: No Recipients

## 2024-06-02 NOTE — PATIENT INSTRUCTIONS
Please complete full 10 days of antibiotic therapy.  After 3 days of antibiotic therapy, please throw away your current toothbrush and begin using a new one.   Please  alternate ibuprofen and Tylenol as needed for fever, and ensure adequate fluid intake and urine output.  1.  Drink plenty fluids.    2.  Take probiotics [i.e. Yogurt, Acidophilus, Florastor (liquid)] daily.    3.  Over-the-counter medications as needed for symptomatic care.    4.   Advance activities as tolerated.    5.   Follow-up with your primary care physician in 3-4 days.    6.  Go to emergency room if symptoms are worsening.    7.  Use a humidifier at bedtime

## 2024-06-02 NOTE — PROGRESS NOTES
[facility]        NAME: Artem Lama is a 23 y.o. female  : 2000    MRN: [id number]  DATE: 2024  TIME: 1:27 PM    Assessment and Plan   Strep pharyngitis [J02.0]  1. Strep pharyngitis  amoxicillin (AMOXIL) 500 mg capsule      2. Sore throat  POCT rapid ANTIGEN strepA            Patient Instructions     Please complete full 10 days of antibiotic therapy.  After 3 days of antibiotic therapy, please throw away your current toothbrush and begin using a new one.   Please  alternate ibuprofen and Tylenol as needed for fever, and ensure adequate fluid intake and urine output.  Follow up with PCP in 3-5 days.  Proceed to  ER if symptoms worsen.    If tests are performed, our office will contact you with results only if changes need to made to the care plan discussed with you at the visit. [facility]    Chief Complaint     Chief Complaint   Patient presents with    Sore Throat     Started 2-3 days ago. Weak, cold, mild cough. Ears popping, headache. Abd pain.          History of Present Illness       23-year-old female presenting for evaluation of sore throat.  Patient states over the past 2 days she has been experiencing sore throat associated with chills, congestion, ear pain, nausea, body aches and headache.  She is been taking Tylenol with mild relief of her symptoms.  Patient is that she works in  and has been exposed to strep pharyngitis.    Sore Throat   Associated symptoms include congestion, ear pain and headaches. Pertinent negatives include no diarrhea, shortness of breath or vomiting.       Review of Systems   Review of Systems   Constitutional:  Positive for chills. Negative for fever.   HENT:  Positive for congestion, ear pain and sore throat.    Respiratory:  Negative for shortness of breath.    Cardiovascular:  Negative for chest pain.   Gastrointestinal:  Positive for nausea. Negative for diarrhea and vomiting.    Musculoskeletal:  Positive for myalgias.   Neurological:  Positive for headaches.   All other systems reviewed and are negative.        Current Medications       Current Outpatient Medications:     Acetaminophen Extra Strength 500 MG tablet, , Disp: , Rfl:     albuterol (ProAir HFA) 90 mcg/act inhaler, Inhale 2 puffs every 6 (six) hours as needed for wheezing, Disp: 8.5 g, Rfl: 0    amoxicillin (AMOXIL) 500 mg capsule, Take 1 capsule (500 mg total) by mouth every 12 (twelve) hours for 10 days, Disp: 20 capsule, Rfl: 0    cetirizine (ZyrTEC) 10 mg tablet, Take 10 mg by mouth daily, Disp: , Rfl:     fluticasone (FLONASE) 50 mcg/act nasal spray, 1 spray into each nostril daily, Disp: 11.1 mL, Rfl: 0    bacitracin topical ointment 500 units/g topical ointment, Apply 1 large application topically 2 (two) times a day (Patient not taking: Reported on 1/16/2023), Disp: 28 g, Rfl: 0    benzonatate (TESSALON) 200 MG capsule, Take 1 capsule (200 mg total) by mouth 3 (three) times a day as needed for cough (Patient not taking: Reported on 8/14/2023), Disp: 20 capsule, Rfl: 0    benzonatate (TESSALON) 200 MG capsule, Take 1 capsule (200 mg total) by mouth 3 (three) times a day as needed for cough, Disp: 20 capsule, Rfl: 0    brompheniramine-pseudoephedrine-DM 30-2-10 MG/5ML syrup, Take 5 mL by mouth 4 (four) times a day as needed for congestion, cough or allergies (Patient not taking: Reported on 1/16/2023), Disp: 120 mL, Rfl: 0    dicyclomine (BENTYL) 20 mg tablet, Take 1 tablet (20 mg total) by mouth 2 (two) times a day as needed (Abdominal pain, diarrhea) (Patient not taking: Reported on 1/16/2023), Disp: 20 tablet, Rfl: 0    ferrous sulfate 325 (65 Fe) mg tablet, Take 325 mg by mouth daily with breakfast (Patient not taking: Reported on 8/14/2023), Disp: , Rfl:     ibuprofen (MOTRIN) 600 mg tablet, TAKE ONE TABLET BY MOUTH EVERY 6 HOURS AS NEEDED FOR MILD PAIN (PAIN SCORE 1-3) (Patient not taking: Reported on 1/16/2023),  Disp: , Rfl:     ketorolac (TORADOL) 10 mg tablet, Take 1 tablet (10 mg total) by mouth every 6 (six) hours as needed for moderate pain or severe pain (Patient not taking: Reported on 1/16/2023), Disp: 20 tablet, Rfl: 0    levalbuterol (Xopenex HFA) 45 mcg/act inhaler, Inhale 1-2 puffs every 4 (four) hours as needed for shortness of breath (Patient not taking: Reported on 8/14/2023), Disp: 15 g, Rfl: 0    Multiple Vitamin (multivitamin) capsule, Take 1 capsule by mouth daily (Patient not taking: Reported on 8/14/2023), Disp: , Rfl:     naproxen (NAPROSYN) 500 mg tablet, Take 1 tablet (500 mg total) by mouth 2 (two) times a day with meals (Patient not taking: Reported on 1/16/2023), Disp: 30 tablet, Rfl: 0    ondansetron (ZOFRAN-ODT) 4 mg disintegrating tablet, Take 1 tablet (4 mg total) by mouth every 6 (six) hours as needed for nausea or vomiting (Patient not taking: Reported on 1/16/2023), Disp: 20 tablet, Rfl: 0    pediatric multivitamin-iron (POLY-VI-SOL with IRON) 15 MG chewable tablet, Chew 1 tablet daily (Patient not taking: Reported on 1/16/2023), Disp: , Rfl:     predniSONE 20 mg tablet, Take 2 tablets (40 mg total) by mouth daily (Patient not taking: Reported on 1/16/2023), Disp: 10 tablet, Rfl: 0    Current Allergies     Allergies as of 06/02/2024 - Reviewed 06/02/2024   Allergen Reaction Noted    Cherry flavor - food allergy  10/23/2020    Latex Other (See Comments) 02/26/2022    Other  10/23/2020    Tea tree oil  12/13/2020            The following portions of the patient's history were reviewed and updated as appropriate: allergies, current medications, past family history, past medical history, past social history, past surgical history and problem list.     Past Medical History:   Diagnosis Date    Palpitations        History reviewed. No pertinent surgical history.    History reviewed. No pertinent family history.      Medications have been verified.        Objective   /82   Pulse (!) 113    Temp 100 °F (37.8 °C)   Resp 16   Wt 70.8 kg (156 lb 2 oz)   SpO2 99%   BMI 35.00 kg/m²        Physical Exam     Physical Exam  Vitals and nursing note reviewed.   Constitutional:       General: She is not in acute distress.     Appearance: Normal appearance. She is normal weight. She is not ill-appearing, toxic-appearing or diaphoretic.   HENT:      Head: Normocephalic and atraumatic.      Right Ear: Tympanic membrane normal.      Left Ear: Tympanic membrane normal.      Nose: Nose normal. No congestion or rhinorrhea.      Mouth/Throat:      Mouth: Mucous membranes are moist.      Pharynx: Oropharynx is clear. No oropharyngeal exudate or posterior oropharyngeal erythema.      Tonsils: Tonsillar exudate present. 3+ on the right. 3+ on the left.   Eyes:      General:         Right eye: No discharge.         Left eye: No discharge.   Cardiovascular:      Rate and Rhythm: Normal rate and regular rhythm.      Pulses: Normal pulses.      Heart sounds: Normal heart sounds. No murmur heard.     No friction rub. No gallop.   Pulmonary:      Effort: Pulmonary effort is normal. No respiratory distress.      Breath sounds: Normal breath sounds. No stridor. No wheezing, rhonchi or rales.   Chest:      Chest wall: No tenderness.   Abdominal:      General: Bowel sounds are normal.      Palpations: Abdomen is soft.      Tenderness: There is no abdominal tenderness.   Lymphadenopathy:      Cervical: Cervical adenopathy present.   Skin:     General: Skin is warm and dry.   Neurological:      Mental Status: She is alert.   Psychiatric:         Mood and Affect: Mood normal.         Behavior: Behavior normal.

## 2024-06-05 DIAGNOSIS — R05.2 SUBACUTE COUGH: ICD-10-CM

## 2024-06-05 RX ORDER — ALBUTEROL SULFATE 90 UG/1
2 AEROSOL, METERED RESPIRATORY (INHALATION) EVERY 6 HOURS PRN
Qty: 8.5 G | Refills: 0 | Status: SHIPPED | OUTPATIENT
Start: 2024-06-05

## 2024-06-27 ENCOUNTER — APPOINTMENT (OUTPATIENT)
Dept: LAB | Age: 24
End: 2024-06-27
Payer: COMMERCIAL

## 2024-06-27 DIAGNOSIS — Z11.3 SCREENING EXAMINATION FOR VENEREAL DISEASE: ICD-10-CM

## 2024-06-27 LAB
HBV SURFACE AB SER-ACNC: 119 MIU/ML
HBV SURFACE AG SER QL: NORMAL
HCV AB SER QL: NORMAL
TREPONEMA PALLIDUM IGG+IGM AB [PRESENCE] IN SERUM OR PLASMA BY IMMUNOASSAY: NORMAL

## 2024-06-27 PROCEDURE — 87340 HEPATITIS B SURFACE AG IA: CPT

## 2024-06-27 PROCEDURE — 86706 HEP B SURFACE ANTIBODY: CPT

## 2024-06-27 PROCEDURE — 86780 TREPONEMA PALLIDUM: CPT

## 2024-06-27 PROCEDURE — 86803 HEPATITIS C AB TEST: CPT

## 2024-06-27 PROCEDURE — 36415 COLL VENOUS BLD VENIPUNCTURE: CPT

## 2024-06-27 PROCEDURE — 87389 HIV-1 AG W/HIV-1&-2 AB AG IA: CPT

## 2024-06-28 LAB
HIV 1+2 AB+HIV1 P24 AG SERPL QL IA: NORMAL
HIV 2 AB SERPL QL IA: NORMAL
HIV1 AB SERPL QL IA: NORMAL
HIV1 P24 AG SERPL QL IA: NORMAL

## 2024-06-30 DIAGNOSIS — R09.82 PND (POST-NASAL DRIP): ICD-10-CM

## 2024-07-02 RX ORDER — FLUTICASONE PROPIONATE 50 MCG
1 SPRAY, SUSPENSION (ML) NASAL DAILY
Qty: 16 ML | Refills: 0 | Status: SHIPPED | OUTPATIENT
Start: 2024-07-02

## 2024-07-10 NOTE — PROGRESS NOTES
[facility]        NAME: Artem Lama is a 23 y.o. female  : 2000    MRN: [id number]  DATE: 2023  TIME: 11:55 AM    Assessment and Plan   Periumbilical abdominal pain [R10.33]  1. Periumbilical abdominal pain  POCT urine HCG    Transfer to other facility            Patient Instructions       Follow up with PCP in 3-5 days.  Proceed to  ER if symptoms worsen.    Chief Complaint     Chief Complaint   Patient presents with    Abdominal Pain     Patient states that she is having severe midline abdominal pain. She states that for two days now she has had abd cramping and bloating, chills and sweats, dry mouth, and diarrhea. She notes N/T intermittent through her feet and she is currently on amoxicillin for sinus infection.          History of Present Illness       Patient is here for evaluation of pain in her abdomen which she has had for 2 days.  She states the pain is in the middle of her abdomen.  She did have some episodes of nausea and vomiting.  She denies any diarrhea or blood in the stool or emesis.    Abdominal Pain  Associated symptoms include nausea and vomiting. Pertinent negatives include no constipation, diarrhea or fever.       Review of Systems   Review of Systems   Constitutional:  Positive for appetite change. Negative for activity change, chills, diaphoresis, fatigue and fever.   Respiratory: Negative.     Cardiovascular: Negative.    Gastrointestinal:  Positive for abdominal pain, nausea and vomiting. Negative for blood in stool, constipation and diarrhea.         Current Medications       Current Outpatient Medications:     Acetaminophen Extra Strength 500 MG tablet, , Disp: , Rfl:     amoxicillin (AMOXIL) 875 mg tablet, Take 1 tablet (875 mg total) by mouth 2 (two) times a day for 7 days, Disp: 14 tablet, Rfl: 0    bacitracin topical ointment 500 units/g topical ointment, Apply 1 large application topically 2 (two) times a day (Patient not taking: Reported  on 1/16/2023), Disp: 28 g, Rfl: 0    benzonatate (TESSALON) 200 MG capsule, Take 1 capsule (200 mg total) by mouth 3 (three) times a day as needed for cough (Patient not taking: Reported on 8/14/2023), Disp: 20 capsule, Rfl: 0    brompheniramine-pseudoephedrine-DM 30-2-10 MG/5ML syrup, Take 5 mL by mouth 4 (four) times a day as needed for congestion, cough or allergies (Patient not taking: Reported on 1/16/2023), Disp: 120 mL, Rfl: 0    cetirizine (ZyrTEC) 10 mg tablet, Take 10 mg by mouth daily (Patient not taking: Reported on 1/16/2023), Disp: , Rfl:     dicyclomine (BENTYL) 20 mg tablet, Take 1 tablet (20 mg total) by mouth 2 (two) times a day as needed (Abdominal pain, diarrhea) (Patient not taking: Reported on 1/16/2023), Disp: 20 tablet, Rfl: 0    ferrous sulfate 325 (65 Fe) mg tablet, Take 325 mg by mouth daily with breakfast (Patient not taking: Reported on 8/14/2023), Disp: , Rfl:     ibuprofen (MOTRIN) 600 mg tablet, TAKE ONE TABLET BY MOUTH EVERY 6 HOURS AS NEEDED FOR MILD PAIN (PAIN SCORE 1-3) (Patient not taking: Reported on 1/16/2023), Disp: , Rfl:     ketorolac (TORADOL) 10 mg tablet, Take 1 tablet (10 mg total) by mouth every 6 (six) hours as needed for moderate pain or severe pain (Patient not taking: Reported on 1/16/2023), Disp: 20 tablet, Rfl: 0    levalbuterol (Xopenex HFA) 45 mcg/act inhaler, Inhale 1-2 puffs every 4 (four) hours as needed for shortness of breath (Patient not taking: Reported on 8/14/2023), Disp: 15 g, Rfl: 0    Multiple Vitamin (multivitamin) capsule, Take 1 capsule by mouth daily (Patient not taking: Reported on 8/14/2023), Disp: , Rfl:     naproxen (NAPROSYN) 500 mg tablet, Take 1 tablet (500 mg total) by mouth 2 (two) times a day with meals (Patient not taking: Reported on 1/16/2023), Disp: 30 tablet, Rfl: 0    ondansetron (ZOFRAN-ODT) 4 mg disintegrating tablet, Take 1 tablet (4 mg total) by mouth every 6 (six) hours as needed for nausea or vomiting (Patient not taking:  Reported on 1/16/2023), Disp: 20 tablet, Rfl: 0    pediatric multivitamin-iron (POLY-VI-SOL with IRON) 15 MG chewable tablet, Chew 1 tablet daily (Patient not taking: Reported on 1/16/2023), Disp: , Rfl:     predniSONE 20 mg tablet, Take 2 tablets (40 mg total) by mouth daily (Patient not taking: Reported on 1/16/2023), Disp: 10 tablet, Rfl: 0    Current Allergies     Allergies as of 12/27/2023 - Reviewed 12/27/2023   Allergen Reaction Noted    Cherry flavor - food allergy  10/23/2020    Latex Other (See Comments) 02/26/2022    Other  10/23/2020    Tea tree oil  12/13/2020            The following portions of the patient's history were reviewed and updated as appropriate: allergies, current medications, past family history, past medical history, past social history, past surgical history and problem list.     Past Medical History:   Diagnosis Date    Palpitations        No past surgical history on file.    No family history on file.      Medications have been verified.        Objective   /72   Pulse 81   Temp 98.1 °F (36.7 °C)   Resp 18   SpO2 99%   No LMP recorded.       Physical Exam     Physical Exam  Vitals and nursing note reviewed.   Constitutional:       General: She is not in acute distress.     Appearance: Normal appearance. She is well-developed. She is ill-appearing. She is not toxic-appearing or diaphoretic.   HENT:      Head: Normocephalic and atraumatic.   Eyes:      Extraocular Movements: Extraocular movements intact.      Conjunctiva/sclera: Conjunctivae normal.      Pupils: Pupils are equal, round, and reactive to light.   Abdominal:      General: Abdomen is flat. Bowel sounds are decreased.      Tenderness: There is abdominal tenderness in the right lower quadrant and periumbilical area. There is guarding. There is no rebound. Negative signs include Roberts's sign.   Skin:     General: Skin is warm and dry.      Findings: No rash.   Neurological:      General: No focal deficit present.       Mental Status: She is alert and oriented to person, place, and time.   Psychiatric:         Mood and Affect: Mood normal.         Behavior: Behavior normal.         Thought Content: Thought content normal.         Judgment: Judgment normal.       Urine pregnancy negative. Concern for appendicitis.             Unknown

## 2024-08-18 ENCOUNTER — OFFICE VISIT (OUTPATIENT)
Dept: URGENT CARE | Age: 24
End: 2024-08-18
Payer: COMMERCIAL

## 2024-08-18 VITALS
TEMPERATURE: 97.1 F | OXYGEN SATURATION: 99 % | DIASTOLIC BLOOD PRESSURE: 80 MMHG | RESPIRATION RATE: 18 BRPM | HEART RATE: 97 BPM | SYSTOLIC BLOOD PRESSURE: 120 MMHG

## 2024-08-18 DIAGNOSIS — J06.9 ACUTE URI: Primary | ICD-10-CM

## 2024-08-18 LAB
SARS-COV-2 AG UPPER RESP QL IA: NEGATIVE
VALID CONTROL: NORMAL

## 2024-08-18 PROCEDURE — S9083 URGENT CARE CENTER GLOBAL: HCPCS

## 2024-08-18 PROCEDURE — 87811 SARS-COV-2 COVID19 W/OPTIC: CPT

## 2024-08-18 PROCEDURE — 99213 OFFICE O/P EST LOW 20 MIN: CPT

## 2024-08-18 RX ORDER — FLUTICASONE PROPIONATE 50 MCG
1 SPRAY, SUSPENSION (ML) NASAL DAILY
Qty: 9.9 ML | Refills: 0 | Status: SHIPPED | OUTPATIENT
Start: 2024-08-18

## 2024-08-18 RX ORDER — BROMPHENIRAMINE MALEATE, PSEUDOEPHEDRINE HYDROCHLORIDE, AND DEXTROMETHORPHAN HYDROBROMIDE 2; 30; 10 MG/5ML; MG/5ML; MG/5ML
5 SYRUP ORAL 4 TIMES DAILY PRN
Qty: 120 ML | Refills: 0 | Status: SHIPPED | OUTPATIENT
Start: 2024-08-18

## 2024-08-18 NOTE — PROGRESS NOTES
[facility]        NAME: Artem Lama is a 23 y.o. female  : 2000    MRN: [id number]  DATE: 2024  TIME: 11:36 AM    Assessment and Plan   Acute URI [J06.9]  1. Acute URI  brompheniramine-pseudoephedrine-DM 30-2-10 MG/5ML syrup    fluticasone (FLONASE) 50 mcg/act nasal spray    Poct Covid 19 Rapid Antigen Test        Patient is afebrile and exam was benign except for some mild bilateral erythematous and edematous nasal turbinates. No sinus tenderness, tonsillar exudate or cervical lymphadenopathy noted. No abx therapy indicated at this time. Counseled patient on supportive therapy with plenty of fluids, tea with honey, throat lozenges. Can use saline nasal irrigation.  Prescribed Flonase for congestion and inflammation. Tylenol or Motrin as needed for pain.May use decongestants as directed.      Patient Instructions       Follow up with PCP in 3-5 days.  Proceed to  ER if symptoms worsen.    If tests have been performed at [facility], our office will contact you with results if changes need to be made to the care plan discussed with you at the visit.  [facility]    Chief Complaint     Chief Complaint   Patient presents with   • Cold Like Symptoms   • Cough   • Fever   • Headache   • Sore Throat   • Fatigue   • Generalized Body Aches     Symptoms listed above started Friday but got much worse yesterday.          History of Present Illness       Pt is a 23 year old female presenting with 3 days of cough, congestion, runny nose, sore throat and body aches.  No CP or SOB, no abd, n/v/d, no fever or chills.  She has taken sudafed, motrin, and tylenol without relief.  She denies sick contacts, no recent travel.     Cough  Associated symptoms include headaches and a sore throat.   Fever  Associated symptoms include coughing, fatigue, headaches and a sore throat.   Headache  Sore Throat   Associated symptoms include coughing and headaches.    Fatigue  Associated symptoms include coughing, fatigue, headaches and a sore throat.   Generalized Body Aches  Associated symptoms include headaches, a sore throat, fatigue and coughing.       Review of Systems   Review of Systems   Constitutional:  Positive for fatigue.   HENT:  Positive for sore throat.    Respiratory:  Positive for cough.    Neurological:  Positive for headaches.         Current Medications       Current Outpatient Medications:   •  Acetaminophen Extra Strength 500 MG tablet, , Disp: , Rfl:   •  albuterol (PROVENTIL HFA,VENTOLIN HFA) 90 mcg/act inhaler, INHALE 2 PUFFS BY MOUTH EVERY 6 HOURS AS NEEDED FOR WHEEZING, Disp: 8.5 g, Rfl: 0  •  benzonatate (TESSALON) 200 MG capsule, Take 1 capsule (200 mg total) by mouth 3 (three) times a day as needed for cough, Disp: 20 capsule, Rfl: 0  •  brompheniramine-pseudoephedrine-DM 30-2-10 MG/5ML syrup, Take 5 mL by mouth 4 (four) times a day as needed for congestion or cough, Disp: 120 mL, Rfl: 0  •  cetirizine (ZyrTEC) 10 mg tablet, Take 10 mg by mouth daily, Disp: , Rfl:   •  fluticasone (FLONASE) 50 mcg/act nasal spray, USE 1 SPRAY IN EACH NOSTRIL ONE TIME A DAY, Disp: 16 mL, Rfl: 0  •  fluticasone (FLONASE) 50 mcg/act nasal spray, 1 spray into each nostril daily, Disp: 9.9 mL, Rfl: 0  •  bacitracin topical ointment 500 units/g topical ointment, Apply 1 large application topically 2 (two) times a day (Patient not taking: Reported on 1/16/2023), Disp: 28 g, Rfl: 0  •  benzonatate (TESSALON) 200 MG capsule, Take 1 capsule (200 mg total) by mouth 3 (three) times a day as needed for cough (Patient not taking: Reported on 8/14/2023), Disp: 20 capsule, Rfl: 0  •  brompheniramine-pseudoephedrine-DM 30-2-10 MG/5ML syrup, Take 5 mL by mouth 4 (four) times a day as needed for congestion, cough or allergies (Patient not taking: Reported on 1/16/2023), Disp: 120 mL, Rfl: 0  •  dicyclomine (BENTYL) 20 mg tablet, Take 1 tablet (20 mg total) by mouth 2 (two) times a  day as needed (Abdominal pain, diarrhea) (Patient not taking: Reported on 1/16/2023), Disp: 20 tablet, Rfl: 0  •  ferrous sulfate 325 (65 Fe) mg tablet, Take 325 mg by mouth daily with breakfast (Patient not taking: Reported on 8/14/2023), Disp: , Rfl:   •  ibuprofen (MOTRIN) 600 mg tablet, TAKE ONE TABLET BY MOUTH EVERY 6 HOURS AS NEEDED FOR MILD PAIN (PAIN SCORE 1-3) (Patient not taking: Reported on 1/16/2023), Disp: , Rfl:   •  ketorolac (TORADOL) 10 mg tablet, Take 1 tablet (10 mg total) by mouth every 6 (six) hours as needed for moderate pain or severe pain (Patient not taking: Reported on 1/16/2023), Disp: 20 tablet, Rfl: 0  •  levalbuterol (Xopenex HFA) 45 mcg/act inhaler, Inhale 1-2 puffs every 4 (four) hours as needed for shortness of breath (Patient not taking: Reported on 8/14/2023), Disp: 15 g, Rfl: 0  •  Multiple Vitamin (multivitamin) capsule, Take 1 capsule by mouth daily (Patient not taking: Reported on 8/14/2023), Disp: , Rfl:   •  naproxen (NAPROSYN) 500 mg tablet, Take 1 tablet (500 mg total) by mouth 2 (two) times a day with meals (Patient not taking: Reported on 1/16/2023), Disp: 30 tablet, Rfl: 0  •  ondansetron (ZOFRAN-ODT) 4 mg disintegrating tablet, Take 1 tablet (4 mg total) by mouth every 6 (six) hours as needed for nausea or vomiting (Patient not taking: Reported on 1/16/2023), Disp: 20 tablet, Rfl: 0  •  pediatric multivitamin-iron (POLY-VI-SOL with IRON) 15 MG chewable tablet, Chew 1 tablet daily (Patient not taking: Reported on 1/16/2023), Disp: , Rfl:   •  predniSONE 20 mg tablet, Take 2 tablets (40 mg total) by mouth daily (Patient not taking: Reported on 1/16/2023), Disp: 10 tablet, Rfl: 0    Current Allergies     Allergies as of 08/18/2024 - Reviewed 08/18/2024   Allergen Reaction Noted   • Cherry flavor - food allergy  10/23/2020   • Latex Other (See Comments) 02/26/2022   • Other  10/23/2020   • Tea tree oil  12/13/2020            The following portions of the patient's history  were reviewed and updated as appropriate: allergies, current medications, past family history, past medical history, past social history, past surgical history and problem list.     Past Medical History:   Diagnosis Date   • Palpitations        History reviewed. No pertinent surgical history.    History reviewed. No pertinent family history.      Medications have been verified.        Objective   /80   Pulse 97   Temp (!) 97.1 °F (36.2 °C)   Resp 18   SpO2 99%   No LMP recorded.       Physical Exam     Physical Exam  Vitals and nursing note reviewed.   Constitutional:       General: She is not in acute distress.     Appearance: She is well-developed and normal weight. She is not toxic-appearing.   HENT:      Head: Normocephalic and atraumatic.      Right Ear: Tympanic membrane normal.      Left Ear: Tympanic membrane normal.      Nose: Congestion and rhinorrhea present.      Right Turbinates: Enlarged.      Left Turbinates: Enlarged.      Right Sinus: No maxillary sinus tenderness or frontal sinus tenderness.      Left Sinus: No maxillary sinus tenderness or frontal sinus tenderness.      Mouth/Throat:      Mouth: Mucous membranes are moist.      Pharynx: Posterior oropharyngeal erythema present.      Tonsils: No tonsillar exudate or tonsillar abscesses. 1+ on the right. 1+ on the left.   Eyes:      Conjunctiva/sclera: Conjunctivae normal.   Cardiovascular:      Rate and Rhythm: Normal rate.      Heart sounds: Normal heart sounds.   Pulmonary:      Effort: Pulmonary effort is normal. No respiratory distress.      Breath sounds: Normal breath sounds. No stridor. No wheezing, rhonchi or rales.   Chest:      Chest wall: No tenderness.   Abdominal:      General: Bowel sounds are normal.      Palpations: Abdomen is soft.   Musculoskeletal:      Cervical back: Normal range of motion.   Lymphadenopathy:      Cervical: No cervical adenopathy.   Skin:     General: Skin is warm.   Neurological:      Mental Status:  She is alert.

## 2024-10-12 ENCOUNTER — OFFICE VISIT (OUTPATIENT)
Dept: OBGYN CLINIC | Facility: CLINIC | Age: 24
End: 2024-10-12
Payer: COMMERCIAL

## 2024-10-12 VITALS
HEIGHT: 56 IN | HEART RATE: 89 BPM | SYSTOLIC BLOOD PRESSURE: 105 MMHG | DIASTOLIC BLOOD PRESSURE: 71 MMHG | WEIGHT: 156 LBS | BODY MASS INDEX: 35.09 KG/M2

## 2024-10-12 DIAGNOSIS — M25.532 LEFT WRIST PAIN: Primary | ICD-10-CM

## 2024-10-12 DIAGNOSIS — M65.4 DE QUERVAIN'S TENOSYNOVITIS, LEFT: ICD-10-CM

## 2024-10-12 PROCEDURE — 99203 OFFICE O/P NEW LOW 30 MIN: CPT | Performed by: PHYSICIAN ASSISTANT

## 2024-10-12 PROCEDURE — 20550 NJX 1 TENDON SHEATH/LIGAMENT: CPT | Performed by: PHYSICIAN ASSISTANT

## 2024-10-12 RX ORDER — TRIAMCINOLONE ACETONIDE 40 MG/ML
40 INJECTION, SUSPENSION INTRA-ARTICULAR; INTRAMUSCULAR
Status: COMPLETED | OUTPATIENT
Start: 2024-10-12 | End: 2024-10-12

## 2024-10-12 RX ORDER — LIDOCAINE HYDROCHLORIDE 10 MG/ML
0.5 INJECTION, SOLUTION INFILTRATION; PERINEURAL
Status: COMPLETED | OUTPATIENT
Start: 2024-10-12 | End: 2024-10-12

## 2024-10-12 RX ADMIN — LIDOCAINE HYDROCHLORIDE 0.5 ML: 10 INJECTION, SOLUTION INFILTRATION; PERINEURAL at 10:00

## 2024-10-12 RX ADMIN — TRIAMCINOLONE ACETONIDE 40 MG: 40 INJECTION, SUSPENSION INTRA-ARTICULAR; INTRAMUSCULAR at 10:00

## 2024-10-12 NOTE — PROGRESS NOTES
Orthopaedic Surgery - Office Note  Artem Lama (23 y.o. female)   : 2000   MRN: [id number]  Encounter Date: 10/12/2024    Chief Complaint   Patient presents with    Left Wrist - Pain         Assessment/Plan  Diagnoses and all orders for this visit:    Left wrist pain  -     Ambulatory Referral to Occupational Therapy; Future  -     Durable Medical Equipment  -     Hand/upper extremity injection: L extensor compartment 1    De Quervain's tenosynovitis, left  -     Ambulatory Referral to Occupational Therapy; Future  -     Durable Medical Equipment  -     Hand/upper extremity injection: L extensor compartment 1    The diagnosis as well as treatment options were reviewed with the patient in the office today.  I would recommend icing the wrist 20 minutes on 1 hour off 3 times a day.  Patient will be provided a thumb spica brace to be worn at night and then during the day as desired for symptomatic activities.  Patient will start an oral anti-inflammatory such as Aleve 1 tablet twice daily with food stopping and calling if any stomach upset occurs.  In addition patient will consider topical anti-inflammatory gel such as Voltaren/diclofenac to be applied as directed on the over-the-counter product.  Patient will be started in formal occupational therapy for evaluation and treatment.    The risks and benefits of a cortisone injection were reviewed.  Shared decision making was utilized.  All question and concerns were reviewed and patient elected to undergo the injection, tolerating it well.  Patient did note significant decrease in pain symptoms postinjection and prior to leaving the exam room.    Patient will return for repeat evaluation in 6 weeks, earlier if symptoms are not improving with conservative care.  All question and concerns were answered in the office today.      Return for Recheck in 6 weeks with myself.        History of Present Illness  This is a new patient with ongoing left wrist  "pain since approximately 9/5/2024.  There is no known trauma.  She is a ..  Patient was seen for this condition at [facility] on 9/16/2024.  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  She is left-hand dominant.  She also has some discomfort with writing activities.  She has tried over-the-counter medications without relief.  She was started on an oral steroid at that time and provided a brace.  The oral steroids had mild relief of her symptoms and she returned for another appointment on 10/4/2024.  She was unable to complete the oral steroids and ibuprofen upset her stomach.  She has been using the brace but not had much relief.  Patient then contacted [facility] on 10/8/2024 requesting an MRI order.  Patient localizes the pain to the first radial compartment.  She has significant pain with thumb range of motion.  She has been using a cock up wrist brace which has not helped her very much.  She states at night she is getting awoken due to the pain below the thumb into the wrist.    Patient is requesting light duty restrictions for work.    Review of Systems  Pertinent items are noted in HPI.  All other systems were reviewed and are negative.    Physical Exam  /71 (BP Location: Right arm, Patient Position: Sitting, Cuff Size: Standard)   Pulse 89   Ht 4' 8\" (1.422 m)   Wt 70.8 kg (156 lb)   BMI 34.97 kg/m²   Cons: Appears well.  No apparent distress.  Psych: Alert. Oriented x3.  Mood and affect normal.    On examination patient's left wrist is without skin breakdown lesion or signs of infection.  She has a markedly positive Finkelstein's test.  She is tender to palpation throughout the first radial compartment.  She is nontender ulnarly and through the forearm.  She has full range of motion of the wrist to flexion, extension, ulnar deviation, radial deviation, supination, and pronation.   strength and pinch strength is painful but 5 out of 5.  She has no tenderness at " "the CMC joint or anatomical snuffbox.  She is nontender on the palmar aspect of the hand and wrist.  Distal radial and ulnar pulses are +2.  There are no signs of trauma.  She is nontender at the [facility].  She has no pain against resistance to wrist extension or flexion.  She has full range of motion of the thumb.  There are no appreciable trigger fingers on clinical examination today              Studies Reviewed  XR wrist 3+ vw left  Order date: 10/4/2024  Authorizing: Generic External Data Provider  Ordered by Generic External Data Provider on 10/4/2024.  Narrative  Clinical indication-left wrist pain. Lifting injury    Procedure-left wrist, 3 view:    Findings-  History: Painful.    3 views of the left wrist reveals no evidence of fracture or dislocation. No  osseous or soft tissue abnormalities seen. No joint effusion is noted.  Impression  Impression: Normal left wrist, without an acute fracture or subluxation.  X-ray reports were reviewed by myself in the office today.  Office visit from 9/16/2024 for the wrist was reviewed by myself in the office today.  Patient phone message and communications were reviewed by myself in the office today.    Hand/upper extremity injection: L extensor compartment 1  Universal Protocol:  Consent: Verbal consent obtained.  Risks and benefits: risks, benefits and alternatives were discussed  Consent given by: patient  Time out: Immediately prior to procedure a \"time out\" was called to verify the correct patient, procedure, equipment, support staff and site/side marked as required.  Patient understanding: patient states understanding of the procedure being performed  Patient consent: the patient's understanding of the procedure matches consent given  Relevant documents: relevant documents present and verified  Test results: test results available and properly labeled  Site marked: the operative site was marked  Radiology Images displayed and confirmed. If images not available, " report reviewed: imaging studies available  Patient identity confirmed: verbally with patient  Supporting Documentation  Indications: tendon swelling and pain   Procedure Details  Condition:de Quervain's tenosynovitis Site: L extensor compartment 1   Preparation: Patient was prepped and draped in the usual sterile fashion  Needle size: 22 G  Medications administered: 0.5 mL lidocaine 1 %; 40 mg triamcinolone acetonide 40 mg/mL  Patient tolerance: patient tolerated the procedure well with no immediate complications  Dressing:  Sterile dressing applied             Medical, Surgical, Family, and Social History  The patient's medical history, family history, and social history, were reviewed and updated as appropriate.    Past Medical History:   Diagnosis Date    Palpitations        History reviewed. No pertinent surgical history.    History reviewed. No pertinent family history.    Social History     Occupational History    Not on file   Tobacco Use    Smoking status: Never    Smokeless tobacco: Never   Vaping Use    Vaping status: Never Used   Substance and Sexual Activity    Alcohol use: Not Currently    Drug use: No    Sexual activity: Not on file       Allergies   Allergen Reactions    Cherry Flavor - Food Allergy      Cherries    Latex Other (See Comments)    Other     Tea Tree Oil          Current Outpatient Medications:     Acetaminophen Extra Strength 500 MG tablet, , Disp: , Rfl:     albuterol (PROVENTIL HFA,VENTOLIN HFA) 90 mcg/act inhaler, INHALE 2 PUFFS BY MOUTH EVERY 6 HOURS AS NEEDED FOR WHEEZING, Disp: 8.5 g, Rfl: 0    bacitracin topical ointment 500 units/g topical ointment, Apply 1 large application topically 2 (two) times a day (Patient not taking: Reported on 1/16/2023), Disp: 28 g, Rfl: 0    benzonatate (TESSALON) 200 MG capsule, Take 1 capsule (200 mg total) by mouth 3 (three) times a day as needed for cough (Patient not taking: Reported on 8/14/2023), Disp: 20 capsule, Rfl: 0    benzonatate  (TESSALON) 200 MG capsule, Take 1 capsule (200 mg total) by mouth 3 (three) times a day as needed for cough, Disp: 20 capsule, Rfl: 0    brompheniramine-pseudoephedrine-DM 30-2-10 MG/5ML syrup, Take 5 mL by mouth 4 (four) times a day as needed for congestion, cough or allergies (Patient not taking: Reported on 1/16/2023), Disp: 120 mL, Rfl: 0    brompheniramine-pseudoephedrine-DM 30-2-10 MG/5ML syrup, Take 5 mL by mouth 4 (four) times a day as needed for congestion or cough, Disp: 120 mL, Rfl: 0    cetirizine (ZyrTEC) 10 mg tablet, Take 10 mg by mouth daily, Disp: , Rfl:     dicyclomine (BENTYL) 20 mg tablet, Take 1 tablet (20 mg total) by mouth 2 (two) times a day as needed (Abdominal pain, diarrhea) (Patient not taking: Reported on 1/16/2023), Disp: 20 tablet, Rfl: 0    ferrous sulfate 325 (65 Fe) mg tablet, Take 325 mg by mouth daily with breakfast (Patient not taking: Reported on 8/14/2023), Disp: , Rfl:     fluticasone (FLONASE) 50 mcg/act nasal spray, USE 1 SPRAY IN EACH NOSTRIL ONE TIME A DAY, Disp: 16 mL, Rfl: 0    fluticasone (FLONASE) 50 mcg/act nasal spray, 1 spray into each nostril daily, Disp: 9.9 mL, Rfl: 0    ibuprofen (MOTRIN) 600 mg tablet, TAKE ONE TABLET BY MOUTH EVERY 6 HOURS AS NEEDED FOR MILD PAIN (PAIN SCORE 1-3) (Patient not taking: Reported on 1/16/2023), Disp: , Rfl:     ketorolac (TORADOL) 10 mg tablet, Take 1 tablet (10 mg total) by mouth every 6 (six) hours as needed for moderate pain or severe pain (Patient not taking: Reported on 1/16/2023), Disp: 20 tablet, Rfl: 0    levalbuterol (Xopenex HFA) 45 mcg/act inhaler, Inhale 1-2 puffs every 4 (four) hours as needed for shortness of breath (Patient not taking: Reported on 8/14/2023), Disp: 15 g, Rfl: 0    Multiple Vitamin (multivitamin) capsule, Take 1 capsule by mouth daily (Patient not taking: Reported on 8/14/2023), Disp: , Rfl:     naproxen (NAPROSYN) 500 mg tablet, Take 1 tablet (500 mg total) by mouth 2 (two) times a day with meals  (Patient not taking: Reported on 1/16/2023), Disp: 30 tablet, Rfl: 0    ondansetron (ZOFRAN-ODT) 4 mg disintegrating tablet, Take 1 tablet (4 mg total) by mouth every 6 (six) hours as needed for nausea or vomiting (Patient not taking: Reported on 1/16/2023), Disp: 20 tablet, Rfl: 0    pediatric multivitamin-iron (POLY-VI-SOL with IRON) 15 MG chewable tablet, Chew 1 tablet daily (Patient not taking: Reported on 1/16/2023), Disp: , Rfl:     predniSONE 20 mg tablet, Take 2 tablets (40 mg total) by mouth daily (Patient not taking: Reported on 1/16/2023), Disp: 10 tablet, Rfl: 0      Humberto Pastor PA-C

## 2024-10-12 NOTE — PATIENT INSTRUCTIONS
"Patient Education     de Quervain tendinopathy   The Basics   Written by the doctors and editors at [facility]   What is de Quervain tendinopathy? -- de Quervain tendinopathy is a condition that causes pain in the thumb and wrist. It is caused by a problem with a tendon. Tendons are strong bands of tissue that connect muscles to bones. de Quervain tendinopathy is sometimes called \"de Quervain tenosynovitis.\"  de Quervain tendinopathy involves tendons that connect the forearm muscles to the thumb. These tendons and the covering around them get inflamed. This causes symptoms.  Most often, de Quervain tendinopathy happens when people use their wrist and thumb too much in certain ways. This includes gripping or grabbing objects (like a tool, golf club, or tennis racket) over and over. But, it can also happen to people for no obvious reason.  What are the symptoms of de Quervain tendinopathy? -- Symptoms include:   Pain in the wrist or thumb   Trouble gripping objects   Swelling in the wrist  Will I need tests? -- Probably not. Your doctor can usually tell if you have de Quervain tendinopathy by learning about your symptoms and doing an exam. During the exam, they will carefully check your thumb, hand, and wrist.  How is de Quervain tendinopathy treated? -- Treatment includes:   Resting your thumb - To avoid moving your thumb, you can wear a splint made for keeping the thumb still.   Ice - You can put a cold gel pack, bag of ice, or bag of frozen vegetables on the painful or swollen area every 4 to 6 hours, for 15 minutes each time.   Pain-relieving medicines called \"NSAIDs\" - NSAIDs are a large group of medicines that includes ibuprofen (sample brand names: Advil, Motrin) and naproxen (sample brand name: Aleve).   Exercises - After your symptoms improve, your doctor or nurse will show you exercises to help your wrist and thumb move more easily.  If your symptoms don't get better with treatment, your doctor might recommend " other treatments. These can include:   Getting a shot of a steroid medicine around the tendon in your wrist - This can help with pain.   Surgery to cut or loosen the covering around the tendon  All topics are updated as new evidence becomes available and our peer review process is complete.  This topic retrieved from [employer] on: Feb 26, 2024.  Topic 65052 Version 12.0  Release: 32.2.4 - C32.56  © 2024 UpToDate, Inc. and/or its affiliates. All rights reserved.  Consumer Information Use and Disclaimer   Disclaimer: This generalized information is a limited summary of diagnosis, treatment, and/or medication information. It is not meant to be comprehensive and should be used as a tool to help the user understand and/or assess potential diagnostic and treatment options. It does NOT include all information about conditions, treatments, medications, side effects, or risks that may apply to a specific patient. It is not intended to be medical advice or a substitute for the medical advice, diagnosis, or treatment of a health care provider based on the health care provider's examination and assessment of a patient's specific and unique circumstances. Patients must speak with a health care provider for complete information about their health, medical questions, and treatment options, including any risks or benefits regarding use of medications. This information does not endorse any treatments or medications as safe, effective, or approved for treating a specific patient. UpToDate, Inc. and its affiliates disclaim any warranty or liability relating to this information or the use thereof.The use of this information is governed by the Terms of Use, available at https://www.wolterskluwer.com/en/know/clinical-effectiveness-terms. 2024© UpToDate, Inc. and its affiliates and/or licensors. All rights reserved.  Copyright   © 2024 UpToDate, Inc. and/or its affiliates. All rights reserved.

## 2024-10-12 NOTE — LETTER
October 12, 2024     Patient: Artem Lama  YOB: 2000  Date of Visit: 10/12/2024      To Whom it May Concern:    Artem Lama is under my professional care. Artem was seen in my office on 10/12/2024. Artem should be on light duty restrictions: Please allow to wear thumb spica brace at work for comfort.  Max lifting 20 pounds with left hand.  If light duty is unavailable patient would be out of work until next evaluation.    If you have any questions or concerns, please don't hesitate to call.         Sincerely,          Humberto Pastor PA-C        CC: No Recipients

## 2024-10-15 ENCOUNTER — TELEPHONE (OUTPATIENT)
Dept: OBGYN CLINIC | Facility: HOSPITAL | Age: 24
End: 2024-10-15

## 2024-10-15 NOTE — TELEPHONE ENCOUNTER
"Caller: Patient    Doctor: Dawit    Reason for call: Patient would like to know if you could revise the work note that you had given her. She just needs the last sentence taken out that reads \"If Light duty is unavailable patient would be out of work until next evaluation\" - Everything else is good just needs that sentence removed. Please place revised letter in her MyChart. Thank you.    Call back#: [phone]  "

## 2024-11-25 ENCOUNTER — OFFICE VISIT (OUTPATIENT)
Dept: OBGYN CLINIC | Facility: CLINIC | Age: 24
End: 2024-11-25
Payer: COMMERCIAL

## 2024-11-25 VITALS
SYSTOLIC BLOOD PRESSURE: 124 MMHG | WEIGHT: 156 LBS | BODY MASS INDEX: 35.09 KG/M2 | HEIGHT: 56 IN | RESPIRATION RATE: 16 BRPM | DIASTOLIC BLOOD PRESSURE: 74 MMHG | HEART RATE: 105 BPM

## 2024-11-25 DIAGNOSIS — M65.4 DE QUERVAIN'S TENOSYNOVITIS, LEFT: ICD-10-CM

## 2024-11-25 DIAGNOSIS — M25.532 LEFT WRIST PAIN: Primary | ICD-10-CM

## 2024-11-25 PROCEDURE — 99213 OFFICE O/P EST LOW 20 MIN: CPT | Performed by: PHYSICIAN ASSISTANT

## 2024-11-25 NOTE — PROGRESS NOTES
Orthopaedic Surgery - Office Note  Artem Lama (24 y.o. female)   : 2000   MRN: [id number]  Encounter Date: 2024    Chief Complaint   Patient presents with    Left Wrist - Follow-up         Assessment/Plan  Diagnoses and all orders for this visit:    Left wrist pain  -     MRI wrist left wo contrast; Future  -     Ambulatory Referral to Orthopedic Surgery; Future  -     Ambulatory Referral to Occupational Therapy; Future    De Quervain's tenosynovitis, left  -     MRI wrist left wo contrast; Future  -     Ambulatory Referral to Orthopedic Surgery; Future  -     Ambulatory Referral to Occupational Therapy; Future    The diagnosis as well as treatment options were reviewed with the patient in the office today.  The brace may be used at night and during the day during aggravating activities.  She may continue to use oral anti-inflammatories as needed with food, stopping calling if any stomach upset occurs in combination with topical anti-inflammatory gel such as Voltaren or diclofenac.  I believe she would benefit from occupational therapy and may schedule as previously ordered.  Repeat cortisone injection versus MRI and surgeon follow-up were reviewed as next step treatment options.  Patient would prefer to get the MRI.  I do not believe a second injection without some dedicated occupational therapy will make much of a lasting difference.  Patient will be sent for the MRI with follow-up scheduled with a hand surgeon.  All question concerns were answered in the office today.     Return for Recheck with hand surgeon to discuss mri of the left wrist..        History of Present Illness  This is a previous patient here for left wrist recheck.  She was seen by myself on 10/12/2024 and received a cortisone injection for de Quervain's tenosynovitis.  She had significant relief of pain prior to leaving the office at that visit.  She was referred to occupational therapy at the last visit but there  "are no notes available.  Patient reports she was unable to make any therapy visits due to her work schedule.  She reports the symptoms completely resolved for a few weeks after the cortisone injection but it gradually returned with a snapping sensation as well.  She denies any new injury.  The pain is located in the radial compartment of the left wrist.    Her history from the 10/12/2024 office visit: \"This is a new patient with ongoing left wrist pain since approximately 9/5/2024.  There is no known trauma.  She is a ..  Patient was seen for this condition at [facility] on 9/16/2024.  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  She is left-hand dominant.  She also has some discomfort with writing activities.  She has tried over-the-counter medications without relief.  She was started on an oral steroid at that time and provided a brace.  The oral steroids had mild relief of her symptoms and she returned for another appointment on 10/4/2024.  She was unable to complete the oral steroids and ibuprofen upset her stomach.  She has been using the brace but not had much relief.  Patient then contacted [facility] on 10/8/2024 requesting an MRI order.  Patient localizes the pain to the first radial compartment.  She has significant pain with thumb range of motion.  She has been using a cock up wrist brace which has not helped her very much.  She states at night she is getting awoken due to the pain below the thumb into the wrist.     Patient is requesting light duty restrictions for work.  \"    Review of Systems  Pertinent items are noted in HPI.  All other systems were reviewed and are negative.    Physical Exam  /74 (BP Location: Right arm, Patient Position: Sitting, Cuff Size: Standard)   Pulse 105   Resp 16   Ht 4' 8\" (1.422 m)   Wt 70.8 kg (156 lb)   BMI 34.97 kg/m²   Cons: Appears well.  No apparent distress.  Psych: Alert. Oriented x3.  Mood and affect normal.    On " examination patient's left wrist is tender to palpation throughout the radial compartment with a positive Finkelstein's test.  She has full active and passive range of motion of the wrist and thumb.  Distal radial and ulnar pulses are +2.  There are no skin breakdown lesions or signs of infection.  Elbow exam is unremarkable.  She is nontender at the distal radius and ulnar styloid.  There is no tenderness in the TFCC, anatomical snuffbox, or CMC joint.              Studies Reviewed  Narrative  Clinical indication-left wrist pain. Lifting injury    Procedure-left wrist, 3 view:    Findings-  History: Painful.    3 views of the left wrist reveals no evidence of fracture or dislocation. No  osseous or soft tissue abnormalities seen. No joint effusion is noted.  Impression  Impression: Normal left wrist, without an acute fracture or subluxation.  X-ray reports were reviewed, previous orthopedic notes including no showed visits were reviewed.  Urgent care and [facility] notes were reviewed.  No occupational therapy notes are available for review.    Procedures  No procedures today.        Medical, Surgical, Family, and Social History  The patient's medical history, family history, and social history, were reviewed and updated as appropriate.    Past Medical History:   Diagnosis Date    Palpitations        History reviewed. No pertinent surgical history.    History reviewed. No pertinent family history.    Social History     Occupational History    Not on file   Tobacco Use    Smoking status: Never    Smokeless tobacco: Never   Vaping Use    Vaping status: Never Used   Substance and Sexual Activity    Alcohol use: Not Currently    Drug use: No    Sexual activity: Not on file       Allergies   Allergen Reactions    Cherry Flavor - Food Allergy      Cherries    Latex Other (See Comments)    Other     Tea Tree Oil          Current Outpatient Medications:     Acetaminophen Extra Strength 500 MG tablet, , Disp: , Rfl:      albuterol (PROVENTIL HFA,VENTOLIN HFA) 90 mcg/act inhaler, INHALE 2 PUFFS BY MOUTH EVERY 6 HOURS AS NEEDED FOR WHEEZING, Disp: 8.5 g, Rfl: 0    bacitracin topical ointment 500 units/g topical ointment, Apply 1 large application topically 2 (two) times a day (Patient not taking: Reported on 1/16/2023), Disp: 28 g, Rfl: 0    benzonatate (TESSALON) 200 MG capsule, Take 1 capsule (200 mg total) by mouth 3 (three) times a day as needed for cough (Patient not taking: Reported on 8/14/2023), Disp: 20 capsule, Rfl: 0    benzonatate (TESSALON) 200 MG capsule, Take 1 capsule (200 mg total) by mouth 3 (three) times a day as needed for cough, Disp: 20 capsule, Rfl: 0    brompheniramine-pseudoephedrine-DM 30-2-10 MG/5ML syrup, Take 5 mL by mouth 4 (four) times a day as needed for congestion, cough or allergies (Patient not taking: Reported on 1/16/2023), Disp: 120 mL, Rfl: 0    brompheniramine-pseudoephedrine-DM 30-2-10 MG/5ML syrup, Take 5 mL by mouth 4 (four) times a day as needed for congestion or cough, Disp: 120 mL, Rfl: 0    cetirizine (ZyrTEC) 10 mg tablet, Take 10 mg by mouth daily, Disp: , Rfl:     dicyclomine (BENTYL) 20 mg tablet, Take 1 tablet (20 mg total) by mouth 2 (two) times a day as needed (Abdominal pain, diarrhea) (Patient not taking: Reported on 1/16/2023), Disp: 20 tablet, Rfl: 0    ferrous sulfate 325 (65 Fe) mg tablet, Take 325 mg by mouth daily with breakfast (Patient not taking: Reported on 8/14/2023), Disp: , Rfl:     fluticasone (FLONASE) 50 mcg/act nasal spray, USE 1 SPRAY IN EACH NOSTRIL ONE TIME A DAY, Disp: 16 mL, Rfl: 0    fluticasone (FLONASE) 50 mcg/act nasal spray, 1 spray into each nostril daily, Disp: 9.9 mL, Rfl: 0    ibuprofen (MOTRIN) 600 mg tablet, TAKE ONE TABLET BY MOUTH EVERY 6 HOURS AS NEEDED FOR MILD PAIN (PAIN SCORE 1-3) (Patient not taking: Reported on 1/16/2023), Disp: , Rfl:     ketorolac (TORADOL) 10 mg tablet, Take 1 tablet (10 mg total) by mouth every 6 (six) hours as needed  for moderate pain or severe pain (Patient not taking: Reported on 1/16/2023), Disp: 20 tablet, Rfl: 0    levalbuterol (Xopenex HFA) 45 mcg/act inhaler, Inhale 1-2 puffs every 4 (four) hours as needed for shortness of breath (Patient not taking: Reported on 8/14/2023), Disp: 15 g, Rfl: 0    Multiple Vitamin (multivitamin) capsule, Take 1 capsule by mouth daily (Patient not taking: Reported on 8/14/2023), Disp: , Rfl:     naproxen (NAPROSYN) 500 mg tablet, Take 1 tablet (500 mg total) by mouth 2 (two) times a day with meals (Patient not taking: Reported on 1/16/2023), Disp: 30 tablet, Rfl: 0    ondansetron (ZOFRAN-ODT) 4 mg disintegrating tablet, Take 1 tablet (4 mg total) by mouth every 6 (six) hours as needed for nausea or vomiting (Patient not taking: Reported on 1/16/2023), Disp: 20 tablet, Rfl: 0    pediatric multivitamin-iron (POLY-VI-SOL with IRON) 15 MG chewable tablet, Chew 1 tablet daily (Patient not taking: Reported on 1/16/2023), Disp: , Rfl:     predniSONE 20 mg tablet, Take 2 tablets (40 mg total) by mouth daily (Patient not taking: Reported on 1/16/2023), Disp: 10 tablet, Rfl: 0      Humberto Pastor PA-C

## 2024-11-25 NOTE — PATIENT INSTRUCTIONS
"Patient Education     de Quervain tendinopathy   The Basics   Written by the doctors and editors at [facility]   What is de Quervain tendinopathy? -- de Quervain tendinopathy is a condition that causes pain in the thumb and wrist. It is caused by a problem with a tendon. Tendons are strong bands of tissue that connect muscles to bones. de Quervain tendinopathy is sometimes called \"de Quervain tenosynovitis.\"  de Quervain tendinopathy involves tendons that connect the forearm muscles to the thumb. These tendons and the covering around them get inflamed. This causes symptoms.  Most often, de Quervain tendinopathy happens when people use their wrist and thumb too much in certain ways. This includes gripping or grabbing objects (like a tool, golf club, or tennis racket) over and over. But, it can also happen to people for no obvious reason.  What are the symptoms of de Quervain tendinopathy? -- Symptoms include:   Pain in the wrist or thumb   Trouble gripping objects   Swelling in the wrist  Will I need tests? -- Probably not. Your doctor can usually tell if you have de Quervain tendinopathy by learning about your symptoms and doing an exam. During the exam, they will carefully check your thumb, hand, and wrist.  How is de Quervain tendinopathy treated? -- Treatment includes:   Resting your thumb - To avoid moving your thumb, you can wear a splint made for keeping the thumb still.   Ice - You can put a cold gel pack, bag of ice, or bag of frozen vegetables on the painful or swollen area every 4 to 6 hours, for 15 minutes each time.   Pain-relieving medicines called \"NSAIDs\" - NSAIDs are a large group of medicines that includes ibuprofen (sample brand names: Advil, Motrin) and naproxen (sample brand name: Aleve).   Exercises - After your symptoms improve, your doctor or nurse will show you exercises to help your wrist and thumb move more easily.  If your symptoms don't get better with treatment, your doctor might recommend " other treatments. These can include:   Getting a shot of a steroid medicine around the tendon in your wrist - This can help with pain.   Surgery to cut or loosen the covering around the tendon  All topics are updated as new evidence becomes available and our peer review process is complete.  This topic retrieved from [employer] on: Feb 26, 2024.  Topic 62483 Version 12.0  Release: 32.2.4 - C32.56  © 2024 UpToDate, Inc. and/or its affiliates. All rights reserved.  Consumer Information Use and Disclaimer   Disclaimer: This generalized information is a limited summary of diagnosis, treatment, and/or medication information. It is not meant to be comprehensive and should be used as a tool to help the user understand and/or assess potential diagnostic and treatment options. It does NOT include all information about conditions, treatments, medications, side effects, or risks that may apply to a specific patient. It is not intended to be medical advice or a substitute for the medical advice, diagnosis, or treatment of a health care provider based on the health care provider's examination and assessment of a patient's specific and unique circumstances. Patients must speak with a health care provider for complete information about their health, medical questions, and treatment options, including any risks or benefits regarding use of medications. This information does not endorse any treatments or medications as safe, effective, or approved for treating a specific patient. UpToDate, Inc. and its affiliates disclaim any warranty or liability relating to this information or the use thereof.The use of this information is governed by the Terms of Use, available at https://www.wolterskluwer.com/en/know/clinical-effectiveness-terms. 2024© UpToDate, Inc. and its affiliates and/or licensors. All rights reserved.  Copyright   © 2024 UpToDate, Inc. and/or its affiliates. All rights reserved.

## 2025-01-10 ENCOUNTER — OFFICE VISIT (OUTPATIENT)
Dept: URGENT CARE | Age: 25
End: 2025-01-10
Payer: COMMERCIAL

## 2025-01-10 VITALS
OXYGEN SATURATION: 98 % | SYSTOLIC BLOOD PRESSURE: 108 MMHG | HEART RATE: 91 BPM | DIASTOLIC BLOOD PRESSURE: 72 MMHG | TEMPERATURE: 98 F | RESPIRATION RATE: 18 BRPM

## 2025-01-10 DIAGNOSIS — J02.9 SORE THROAT: ICD-10-CM

## 2025-01-10 DIAGNOSIS — J06.9 VIRAL URI WITH COUGH: Primary | ICD-10-CM

## 2025-01-10 LAB — S PYO AG THROAT QL: NEGATIVE

## 2025-01-10 PROCEDURE — 99214 OFFICE O/P EST MOD 30 MIN: CPT | Performed by: PHYSICIAN ASSISTANT

## 2025-01-10 PROCEDURE — 87147 CULTURE TYPE IMMUNOLOGIC: CPT | Performed by: PHYSICIAN ASSISTANT

## 2025-01-10 PROCEDURE — 87070 CULTURE OTHR SPECIMN AEROBIC: CPT | Performed by: PHYSICIAN ASSISTANT

## 2025-01-10 PROCEDURE — S9083 URGENT CARE CENTER GLOBAL: HCPCS | Performed by: PHYSICIAN ASSISTANT

## 2025-01-10 RX ORDER — FLUTICASONE PROPIONATE 50 MCG
1 SPRAY, SUSPENSION (ML) NASAL DAILY
Qty: 9.9 ML | Refills: 0 | Status: SHIPPED | OUTPATIENT
Start: 2025-01-10

## 2025-01-10 RX ORDER — BROMPHENIRAMINE MALEATE, PSEUDOEPHEDRINE HYDROCHLORIDE, AND DEXTROMETHORPHAN HYDROBROMIDE 2; 30; 10 MG/5ML; MG/5ML; MG/5ML
10 SYRUP ORAL 3 TIMES DAILY PRN
Qty: 120 ML | Refills: 0 | Status: SHIPPED | OUTPATIENT
Start: 2025-01-10

## 2025-01-10 NOTE — PATIENT INSTRUCTIONS
Symptoms and exam are most consistent with viral URI.  These typically run their course in 7 to 10 days.  Worst time for symptoms is day 3 through 5.  Use Bromfed as prescribed as needed.  Use Flonase nasal spray as prescribed as needed.  Recommend saline sinus rinses.  Motrin and Tylenol as needed for pain.  If symptoms or not improved in 3 to 5 days follow-up with PCP.  If symptoms worsen or new symptoms develop report to the emergency room immediately

## 2025-01-10 NOTE — PROGRESS NOTES
[facility]        NAME: Artem Lama is a 24 y.o. female  : 2000    MRN: [id number]  DATE: January 10, 2025  TIME: 10:39 AM    Assessment and Plan   Viral URI with cough [J06.9]  1. Viral URI with cough  fluticasone (FLONASE) 50 mcg/act nasal spray    brompheniramine-pseudoephedrine-DM 30-2-10 MG/5ML syrup      2. Sore throat  POCT rapid ANTIGEN strepA    Throat culture      Patient presents with symptoms and examination most consistent with viral URI.  She is complaining of sore throat symptoms rapid strep in clinic is negative.  Will send for culture notify of any positive result.  Rx for Bromfed and fluticasone nasal spray sent.  Discussed saline sinus rinses as well.      Patient Instructions     Patient Instructions   Symptoms and exam are most consistent with viral URI.  These typically run their course in 7 to 10 days.  Worst time for symptoms is day 3 through 5.  Use Bromfed as prescribed as needed.  Use Flonase nasal spray as prescribed as needed.  Recommend saline sinus rinses.  Motrin and Tylenol as needed for pain.  If symptoms or not improved in 3 to 5 days follow-up with PCP.  If symptoms worsen or new symptoms develop report to the emergency room immediately    Follow up with PCP in 3-5 days.  Proceed to  ER if symptoms worsen.    If tests have been performed at [facility], our office will contact you with results if changes need to be made to the care plan discussed with you at the visit. [facility].     Chief Complaint     Chief Complaint   Patient presents with    Cold Like Symptoms    Fever    Generalized Body Aches    Fatigue    Earache    Cough     Symptoms started yesterday and are getting worse.          History of Present Illness       24 year old female presents with complaint of runny nose, sinus congestion, cough, bilateral ear pain, and fever.  Patient reports Tmax of 120.  She states that she has had blue fluid come  out of her left ear and has a lot of pressure in her left ear.  She does have a Q-tip with her with blue cotton tip partially blue wooden stick.  She denies chest pain and shortness of breath.  Does have been present for approximately 3 days.  Patient also notes sore throat and states that she occasionally feels like her throat is closing.  She states she had a negative home COVID test.  Has been treating with homemade ginger ale that has been warmed up.    Fever  Associated symptoms include chills, congestion, coughing, fatigue, a fever, headaches and a sore throat. Pertinent negatives include no chest pain.   Generalized Body Aches  Associated symptoms include congestion, ear pain, headaches, rhinorrhea, a sore throat, fatigue, a fever and coughing. Pertinent negatives include no chest pain or shortness of breath.   Fatigue  Associated symptoms include chills, congestion, coughing, fatigue, a fever, headaches and a sore throat. Pertinent negatives include no chest pain.   Earache   Associated symptoms include coughing, headaches, rhinorrhea and a sore throat.   Cough  Associated symptoms include chills, ear pain, a fever, headaches, postnasal drip, rhinorrhea and a sore throat. Pertinent negatives include no chest pain or shortness of breath.       Review of Systems   Review of Systems   Constitutional:  Positive for chills, fatigue and fever.   HENT:  Positive for congestion, ear pain, postnasal drip, rhinorrhea and sore throat. Negative for trouble swallowing and voice change.    Respiratory:  Positive for cough. Negative for shortness of breath.    Cardiovascular:  Negative for chest pain.   Neurological:  Positive for headaches. Negative for dizziness and light-headedness.   Psychiatric/Behavioral:  Negative for confusion.          Current Medications       Current Outpatient Medications:     Acetaminophen Extra Strength 500 MG tablet, , Disp: , Rfl:     albuterol (PROVENTIL HFA,VENTOLIN HFA) 90 mcg/act  inhaler, INHALE 2 PUFFS BY MOUTH EVERY 6 HOURS AS NEEDED FOR WHEEZING, Disp: 8.5 g, Rfl: 0    benzonatate (TESSALON) 200 MG capsule, Take 1 capsule (200 mg total) by mouth 3 (three) times a day as needed for cough, Disp: 20 capsule, Rfl: 0    brompheniramine-pseudoephedrine-DM 30-2-10 MG/5ML syrup, Take 10 mL by mouth 3 (three) times a day as needed for allergies, cough or congestion, Disp: 120 mL, Rfl: 0    cetirizine (ZyrTEC) 10 mg tablet, Take 10 mg by mouth daily, Disp: , Rfl:     fluticasone (FLONASE) 50 mcg/act nasal spray, 1 spray into each nostril daily, Disp: 9.9 mL, Rfl: 0    naproxen (NAPROSYN) 500 mg tablet, Take 1 tablet (500 mg total) by mouth 2 (two) times a day with meals, Disp: 30 tablet, Rfl: 0    bacitracin topical ointment 500 units/g topical ointment, Apply 1 large application topically 2 (two) times a day (Patient not taking: Reported on 1/10/2025), Disp: 28 g, Rfl: 0    benzonatate (TESSALON) 200 MG capsule, Take 1 capsule (200 mg total) by mouth 3 (three) times a day as needed for cough (Patient not taking: Reported on 1/10/2025), Disp: 20 capsule, Rfl: 0    dicyclomine (BENTYL) 20 mg tablet, Take 1 tablet (20 mg total) by mouth 2 (two) times a day as needed (Abdominal pain, diarrhea) (Patient not taking: Reported on 1/10/2025), Disp: 20 tablet, Rfl: 0    ferrous sulfate 325 (65 Fe) mg tablet, Take 325 mg by mouth daily with breakfast (Patient not taking: Reported on 1/10/2025), Disp: , Rfl:     ibuprofen (MOTRIN) 600 mg tablet, TAKE ONE TABLET BY MOUTH EVERY 6 HOURS AS NEEDED FOR MILD PAIN (PAIN SCORE 1-3) (Patient not taking: Reported on 1/10/2025), Disp: , Rfl:     ketorolac (TORADOL) 10 mg tablet, Take 1 tablet (10 mg total) by mouth every 6 (six) hours as needed for moderate pain or severe pain (Patient not taking: Reported on 1/10/2025), Disp: 20 tablet, Rfl: 0    levalbuterol (Xopenex HFA) 45 mcg/act inhaler, Inhale 1-2 puffs every 4 (four) hours as needed for shortness of breath  (Patient not taking: Reported on 1/10/2025), Disp: 15 g, Rfl: 0    Multiple Vitamin (multivitamin) capsule, Take 1 capsule by mouth daily (Patient not taking: Reported on 1/10/2025), Disp: , Rfl:     ondansetron (ZOFRAN-ODT) 4 mg disintegrating tablet, Take 1 tablet (4 mg total) by mouth every 6 (six) hours as needed for nausea or vomiting (Patient not taking: Reported on 1/10/2025), Disp: 20 tablet, Rfl: 0    pediatric multivitamin-iron (POLY-VI-SOL with IRON) 15 MG chewable tablet, Chew 1 tablet daily (Patient not taking: Reported on 1/10/2025), Disp: , Rfl:     predniSONE 20 mg tablet, Take 2 tablets (40 mg total) by mouth daily (Patient not taking: Reported on 1/10/2025), Disp: 10 tablet, Rfl: 0    Current Allergies     Allergies as of 01/10/2025 - Reviewed 01/10/2025   Allergen Reaction Noted    Cherry flavor - food allergy  10/23/2020    Latex Other (See Comments) 02/26/2022    Other  10/23/2020    Tea tree oil  12/13/2020            The following portions of the patient's history were reviewed and updated as appropriate: allergies, current medications, past family history, past medical history, past social history, past surgical history and problem list.     Past Medical History:   Diagnosis Date    Palpitations        No past surgical history on file.    No family history on file.      Medications have been verified.        Objective   /72   Pulse 91   Temp 98 °F (36.7 °C)   Resp 18   SpO2 98%   No LMP recorded.       Physical Exam     Physical Exam  Vitals and nursing note reviewed.   Constitutional:       General: She is not in acute distress.     Appearance: Normal appearance. She is not ill-appearing or toxic-appearing.   HENT:      Head: Normocephalic and atraumatic.      Jaw: No trismus.      Right Ear: Hearing, tympanic membrane, ear canal and external ear normal. There is no impacted cerumen. No foreign body.      Left Ear: Hearing, ear canal and external ear normal. No laceration,  drainage, swelling or tenderness. A middle ear effusion is present. There is no impacted cerumen. No foreign body. Tympanic membrane is not injected, scarred, perforated, erythematous, retracted or bulging.      Ears:      Comments: Left ear canal is nonerythematous that is not swollen no drainage present.     Nose: Mucosal edema, congestion and rhinorrhea present. No nasal deformity. Rhinorrhea is clear.      Right Nostril: No foreign body, epistaxis or occlusion.      Left Nostril: No foreign body, epistaxis or occlusion.      Right Turbinates: Not enlarged, swollen or pale.      Left Turbinates: Not enlarged, swollen or pale.      Right Sinus: No maxillary sinus tenderness or frontal sinus tenderness.      Left Sinus: No maxillary sinus tenderness or frontal sinus tenderness.      Mouth/Throat:      Lips: Pink. No lesions.      Mouth: Mucous membranes are moist. No injury, oral lesions or angioedema.      Dentition: Normal dentition.      Tongue: No lesions. Tongue does not deviate from midline.      Palate: No mass and lesions.      Pharynx: Uvula midline. Posterior oropharyngeal erythema and postnasal drip present. No pharyngeal swelling, oropharyngeal exudate or uvula swelling.      Tonsils: No tonsillar exudate or tonsillar abscesses.   Eyes:      General: Lids are normal. Gaze aligned appropriately. No allergic shiner.     Extraocular Movements: Extraocular movements intact.   Cardiovascular:      Rate and Rhythm: Normal rate and regular rhythm.      Heart sounds: Normal heart sounds, S1 normal and S2 normal.   Pulmonary:      Effort: Pulmonary effort is normal.      Breath sounds: Normal breath sounds.      Comments: Patient speaking in full sentences with no increased respiratory effort.   No audible wheezing or stridor.   Lymphadenopathy:      Cervical: No cervical adenopathy.   Skin:     General: Skin is warm and dry.   Neurological:      Mental Status: She is alert and oriented to person, place, and  "time.      Coordination: Coordination is intact.      Gait: Gait is intact.   Psychiatric:         Attention and Perception: Attention and perception normal.         Mood and Affect: Mood and affect normal.         Speech: Speech normal.         Behavior: Behavior is cooperative.               Note: Portions of this record may have been created with voice recognition software. Occasional wrong word or \"sound a like\" substitutions may have occurred due to the inherent limitations of voice recognition software. Please read the chart carefully and recognize, using context, where substitutions have occurred.*      "

## 2025-01-12 ENCOUNTER — TELEPHONE (OUTPATIENT)
Dept: URGENT CARE | Age: 25
End: 2025-01-12

## 2025-01-12 DIAGNOSIS — J02.0 STREP PHARYNGITIS: Primary | ICD-10-CM

## 2025-01-12 LAB — BACTERIA THROAT CULT: ABNORMAL

## 2025-01-12 RX ORDER — AMOXICILLIN 500 MG/1
500 CAPSULE ORAL EVERY 12 HOURS SCHEDULED
Qty: 20 CAPSULE | Refills: 0 | Status: SHIPPED | OUTPATIENT
Start: 2025-01-12 | End: 2025-01-22

## 2025-01-12 NOTE — TELEPHONE ENCOUNTER
Discussed throat culture result with patient, 1+ group A strep, she is still having sore throat.  Confirmed no antibiotic allergies and amoxicillin sent to her preferred pharmacy.  Change toothbrush after 3 days, avoid sharing utensils/beverages.  All questions answered.

## 2025-02-04 ENCOUNTER — HOSPITAL ENCOUNTER (OUTPATIENT)
Dept: RADIOLOGY | Age: 25
Discharge: HOME | End: 2025-02-04
Payer: COMMERCIAL

## 2025-02-04 DIAGNOSIS — M65.4 DE QUERVAIN'S TENOSYNOVITIS, LEFT: ICD-10-CM

## 2025-02-04 DIAGNOSIS — M25.532 LEFT WRIST PAIN: ICD-10-CM

## 2025-02-04 PROCEDURE — 73221 MRI JOINT UPR EXTREM W/O DYE: CPT

## 2025-02-06 ENCOUNTER — RESULTS FOLLOW-UP (OUTPATIENT)
Dept: OBGYN CLINIC | Facility: CLINIC | Age: 25
End: 2025-02-06

## 2025-02-12 ENCOUNTER — OFFICE VISIT (OUTPATIENT)
Dept: OBGYN CLINIC | Facility: CLINIC | Age: 25
End: 2025-02-12
Payer: COMMERCIAL

## 2025-02-12 VITALS — WEIGHT: 160 LBS | DIASTOLIC BLOOD PRESSURE: 74 MMHG | BODY MASS INDEX: 35.87 KG/M2 | SYSTOLIC BLOOD PRESSURE: 116 MMHG

## 2025-02-12 DIAGNOSIS — Z01.419 WOMEN'S ANNUAL ROUTINE GYNECOLOGICAL EXAMINATION: Primary | ICD-10-CM

## 2025-02-12 DIAGNOSIS — Z11.3 SCREENING FOR STDS (SEXUALLY TRANSMITTED DISEASES): ICD-10-CM

## 2025-02-12 PROCEDURE — 87591 N.GONORRHOEAE DNA AMP PROB: CPT | Performed by: OBSTETRICS & GYNECOLOGY

## 2025-02-12 PROCEDURE — 99385 PREV VISIT NEW AGE 18-39: CPT | Performed by: OBSTETRICS & GYNECOLOGY

## 2025-02-12 PROCEDURE — G0145 SCR C/V CYTO,THINLAYER,RESCR: HCPCS | Performed by: OBSTETRICS & GYNECOLOGY

## 2025-02-12 PROCEDURE — 87491 CHLMYD TRACH DNA AMP PROBE: CPT | Performed by: OBSTETRICS & GYNECOLOGY

## 2025-02-12 NOTE — PROGRESS NOTES
Name: Artem Lama      : 2000      MRN: [id number]  Encounter Provider: Lawrence Herndon MD  Encounter Date: 2025   Encounter department: [facility]  :  Assessment & Plan    The patient was informed of a stable GYN examination.  A Pap smear was performed.  We will also will screen for GC and chlamydia.  She is content with her weight.  She is feels safe at home.  She sees a dentist on a regular basis she is in a sexual relationship.  Her mother contraception clued condoms.  All questions were answered she should return to my office in 1 year unless new issues or problems occur.    History of Present Illness   HPI  Artem Lama is a 24 y.o. female who presents for her annual GYN examination.  She is nulliparous.  She is in a relationship.  Her current method of contraception includes condoms.  She is happy with that method.  Menarche was age 10 her cycles are mostly regular.  She has about 4-1/2 days flow.  Occasions she experiences dysmenorrhea.  We talked about taking nonsteroidal anti-inflammatory agents few days before menstrual cycle starts to see if that improves her cycles.  She is not happy with her weight.  She feels safe at home.  She was reminded to dentist on a regular basis.  Denies any problem with depression.  She does admit to panic attacks.  Her PHQ score today is a total of 4 with 3 for feeling tired or little energy and 1 for trouble concentrating.  Family history reviewed.  No major family illnesses reported at this time.  She got the first dose of Gardasil vaccine she did not complete.  She is an  and herbalist.  She believes a little medication if needed.      Review of Systems   Genitourinary:         Dysmenorrhea   All other systems reviewed and are negative.         Objective   /74   Wt 72.6 kg (160 lb)   LMP 2025 (Exact Date)   BMI 35.87 kg/m²      Physical Exam  Vitals reviewed. Exam conducted with a chaperone  present.   Constitutional:       Appearance: Normal appearance. She is normal weight.   HENT:      Head: Normocephalic and atraumatic.      Nose: Nose normal.      Mouth/Throat:      Mouth: Mucous membranes are moist.   Eyes:      Extraocular Movements: Extraocular movements intact.      Pupils: Pupils are equal, round, and reactive to light.   Cardiovascular:      Rate and Rhythm: Normal rate and regular rhythm.      Pulses: Normal pulses.      Heart sounds: Normal heart sounds.   Pulmonary:      Effort: Pulmonary effort is normal.      Breath sounds: Normal breath sounds.   Chest:   Breasts:     Breasts are symmetrical.      Right: Normal.      Left: Normal.   Abdominal:      General: Abdomen is flat. Bowel sounds are normal.      Palpations: Abdomen is soft. There is no hepatomegaly or splenomegaly.      Tenderness: There is no abdominal tenderness.      Hernia: No hernia is present. There is no hernia in the umbilical area, ventral area, left inguinal area or right inguinal area.   Genitourinary:     General: Normal vulva.      Pubic Area: No rash or pubic lice.       Labia:         Right: No rash, tenderness, lesion or injury.         Left: No rash, tenderness, lesion or injury.       Urethra: No prolapse, urethral pain, urethral swelling or urethral lesion.      Comments: The external genitalia within normal limits the vagina is clean with no evidence of discharge.  The cervix is nulliparous.  A Pap smear was performed.  Uterus is normal size.  There is no cervical motion tenderness.  The adnexa clear bilaterally.  The urethra and bladder normal working relationship.  There is no evidence of prolapse.  Musculoskeletal:         General: Normal range of motion.      Cervical back: Normal range of motion.   Lymphadenopathy:      Upper Body:      Right upper body: No supraclavicular adenopathy.      Left upper body: No supraclavicular adenopathy.   Skin:     General: Skin is warm and dry.   Neurological:       General: No focal deficit present.      Mental Status: She is alert and oriented to person, place, and time.   Psychiatric:         Mood and Affect: Mood normal.         Behavior: Behavior normal.

## 2025-02-12 NOTE — PATIENT INSTRUCTIONS
Patient was informed of a stable GYN examination.  A Pap smear was performed.  She is content with her weight.  She feels safe at home.  She is a dentist on a regular basis.  Is no prior depression or anxiety.  I Pap smear was performed we will also will screen for GC and chlamydia.  She is happy with condoms for contraception.  If she changes her mind she will let us know.  She should return to my office in 1 year.

## 2025-02-14 LAB
C TRACH DNA SPEC QL NAA+PROBE: NEGATIVE
LAB AP GYN PRIMARY INTERPRETATION: NORMAL
Lab: NORMAL
N GONORRHOEA DNA SPEC QL NAA+PROBE: NEGATIVE

## 2025-02-16 ENCOUNTER — HOSPITAL ENCOUNTER (OUTPATIENT)
Dept: RADIOLOGY | Facility: HOSPITAL | Age: 25
Discharge: HOME | End: 2025-02-16
Payer: COMMERCIAL

## 2025-02-16 DIAGNOSIS — R16.0 LIVER MASS, LEFT LOBE: ICD-10-CM

## 2025-02-16 PROCEDURE — 74183 MRI ABD W/O CNTR FLWD CNTR: CPT

## 2025-02-16 PROCEDURE — A9585 GADOBUTROL INJECTION: HCPCS | Performed by: RADIOLOGY

## 2025-02-16 RX ORDER — GADOBUTROL 604.72 MG/ML
7 INJECTION INTRAVENOUS
Status: COMPLETED | OUTPATIENT
Start: 2025-02-16 | End: 2025-02-16

## 2025-02-16 RX ADMIN — GADOBUTROL 7 ML: 604.72 INJECTION INTRAVENOUS at 13:33

## 2025-02-17 ENCOUNTER — OFFICE VISIT (OUTPATIENT)
Dept: OBGYN CLINIC | Facility: HOSPITAL | Age: 25
End: 2025-02-17
Payer: COMMERCIAL

## 2025-02-17 VITALS — HEIGHT: 56 IN | WEIGHT: 160 LBS | BODY MASS INDEX: 35.99 KG/M2

## 2025-02-17 DIAGNOSIS — M25.532 LEFT WRIST PAIN: ICD-10-CM

## 2025-02-17 DIAGNOSIS — M65.4 DE QUERVAIN'S TENOSYNOVITIS, LEFT: ICD-10-CM

## 2025-02-17 PROCEDURE — 99243 OFF/OP CNSLTJ NEW/EST LOW 30: CPT | Performed by: SURGERY

## 2025-02-17 NOTE — PROGRESS NOTES
Andres Bazzi M.D.  Attending, Orthopaedic Surgery  Hand, Wrist, and Elbow Surgery  [facility]      ORTHOPAEDIC HAND, WRIST, AND ELBOW OFFICE  VISIT       ASSESSMENT/PLAN:        Assessment & Plan  De Quervain's tenosynovitis, left  Reviewed physical exam and imaging with patient at time of visit. Radiographic findings demonstrate De Quervain's tenosynovitis of left wrist. Discussed treatment options including continued observation, activity modification, bracing, anti-inflammatories, hand therapy, possible cortisone injection however recommend avoiding due to hyperpigmentation of left wrist, versus surgical intervention.  Patient elected to proceed with conservative treatment at time of visit. Referral to hand therapy was placed at time of visit. Patient was advised to continue nighttime splinting. Patient will follow-up in the office in 8 weeks for re-evaluation of left wrist pain if symptoms persist or new symptoms arise. The patient expresses understanding and is in agreement with today's treatment plan.           Orders:    Ambulatory Referral to Orthopedic Surgery    Ambulatory Referral to PT/OT Hand Therapy; Future        The patient verbalized understanding of exam findings and treatment plan. We engaged in the shared decision-making process and treatment options were discussed at length with the patient. Surgical and conservative management discussed today along with risks and benefits.    Diagnoses and all orders for this visit:    De Quervain's tenosynovitis, left  -     Ambulatory Referral to Orthopedic Surgery  -     Ambulatory Referral to PT/OT Hand Therapy; Future    Left wrist pain  -     Ambulatory Referral to Orthopedic Surgery        Follow Up:  Return in about 8 weeks (around 4/14/2025) for Recheck.    To Do Next Visit:  Re-evaluation of current issue      General Discussions:  De Quervain Tenosynovitis: The anatomy and physiology of de Quervain's tenosynovitis was  discussed with the patient today in the office.  Edema and increased contact pressure within the first dorsal extensor compartment at the radial styloid can cause pain, crepitation, and limitation of function.  Treatment options include resting thumb spica splints to decrease edema, oral anti-inflammatory medications, home or formal therapy exercises, up to 2 steroid injections within the first dorsal extensor compartment, or surgical release.  While majority of patients do respond to conservative treatment, up to 20% may require surgical release.     Operative Discussions:    ____________________________________________________________________________________________________________________________________________      CHIEF COMPLAINT:  Chief Complaint   Patient presents with    Left Hand - Pain    Left Wrist - Pain         SUBJECTIVE:  Artem Lama is a 24 y.o. year old LHD female who presents for initial evaluation regarding left wrist pain with De Quervain's tenosynovitis. Patient states that her symptoms started in October and she was seen by Humberto Pastor PA-C where she was given a CS injection with approximately 4 weeks of relief. She states that she thinks that the short-term relief was attributed to returning to her work setting of . 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 She states that her symptoms have improved however she does still have moments where she notices pain. She has been consistent with night time bracing.     Pain/symptom timing:  Worse during the day when active  Pain/symptom context:  Worse with activites and work  Pain/symptom modifying factors:  Rest makes better, activities make worse  Pain/symptom associated signs/symptoms: none    Prior treatment   NSAIDs: ibuprofen - with partial relief of symptoms   Injections Yes - CSI to left wrist for deQuervain's tenosynovitis on 10/12/24 with approximately 4 weeks of  relief  Bracing/Orthotics Yes  - night time splinting with relief of symptoms  Physical Therapy No     I have personally reviewed all the relevant PMH, PSH, SH, FH, Medications and allergies      PAST MEDICAL HISTORY:  Past Medical History:   Diagnosis Date    Palpitations        PAST SURGICAL HISTORY:  History reviewed. No pertinent surgical history.    FAMILY HISTORY:  History reviewed. No pertinent family history.    SOCIAL HISTORY:  Social History     Tobacco Use    Smoking status: Never    Smokeless tobacco: Never   Vaping Use    Vaping status: Never Used   Substance Use Topics    Alcohol use: Not Currently    Drug use: No       MEDICATIONS:    Current Outpatient Medications:     Acetaminophen Extra Strength 500 MG tablet, , Disp: , Rfl:     ferrous sulfate 325 (65 Fe) mg tablet, Take 325 mg by mouth daily with breakfast, Disp: , Rfl:     fluticasone (FLONASE) 50 mcg/act nasal spray, 1 spray into each nostril daily, Disp: 9.9 mL, Rfl: 0    ketorolac (TORADOL) 10 mg tablet, Take 1 tablet (10 mg total) by mouth every 6 (six) hours as needed for moderate pain or severe pain, Disp: 20 tablet, Rfl: 0    Multiple Vitamin (multivitamin) capsule, Take 1 capsule by mouth daily, Disp: , Rfl:     albuterol (PROVENTIL HFA,VENTOLIN HFA) 90 mcg/act inhaler, INHALE 2 PUFFS BY MOUTH EVERY 6 HOURS AS NEEDED FOR WHEEZING (Patient not taking: Reported on 2/17/2025), Disp: 8.5 g, Rfl: 0    bacitracin topical ointment 500 units/g topical ointment, Apply 1 large application topically 2 (two) times a day (Patient not taking: Reported on 2/17/2025), Disp: 28 g, Rfl: 0    benzonatate (TESSALON) 200 MG capsule, Take 1 capsule (200 mg total) by mouth 3 (three) times a day as needed for cough (Patient not taking: Reported on 2/17/2025), Disp: 20 capsule, Rfl: 0    benzonatate (TESSALON) 200 MG capsule, Take 1 capsule (200 mg total) by mouth 3 (three) times a day as needed for cough (Patient not taking: Reported on 2/12/2025), Disp: 20  capsule, Rfl: 0    brompheniramine-pseudoephedrine-DM 30-2-10 MG/5ML syrup, Take 10 mL by mouth 3 (three) times a day as needed for allergies, cough or congestion (Patient not taking: Reported on 2/12/2025), Disp: 120 mL, Rfl: 0    cetirizine (ZyrTEC) 10 mg tablet, Take 10 mg by mouth daily (Patient not taking: Reported on 2/17/2025), Disp: , Rfl:     dicyclomine (BENTYL) 20 mg tablet, Take 1 tablet (20 mg total) by mouth 2 (two) times a day as needed (Abdominal pain, diarrhea) (Patient not taking: Reported on 2/17/2025), Disp: 20 tablet, Rfl: 0    ibuprofen (MOTRIN) 600 mg tablet, TAKE ONE TABLET BY MOUTH EVERY 6 HOURS AS NEEDED FOR MILD PAIN (PAIN SCORE 1-3) (Patient not taking: Reported on 2/17/2025), Disp: , Rfl:     levalbuterol (Xopenex HFA) 45 mcg/act inhaler, Inhale 1-2 puffs every 4 (four) hours as needed for shortness of breath (Patient not taking: Reported on 2/17/2025), Disp: 15 g, Rfl: 0    naproxen (NAPROSYN) 500 mg tablet, Take 1 tablet (500 mg total) by mouth 2 (two) times a day with meals (Patient not taking: Reported on 2/12/2025), Disp: 30 tablet, Rfl: 0    ondansetron (ZOFRAN-ODT) 4 mg disintegrating tablet, Take 1 tablet (4 mg total) by mouth every 6 (six) hours as needed for nausea or vomiting (Patient not taking: Reported on 2/17/2025), Disp: 20 tablet, Rfl: 0    pediatric multivitamin-iron (POLY-VI-SOL with IRON) 15 MG chewable tablet, Chew 1 tablet daily (Patient not taking: Reported on 2/17/2025), Disp: , Rfl:     predniSONE 20 mg tablet, Take 2 tablets (40 mg total) by mouth daily (Patient not taking: Reported on 2/17/2025), Disp: 10 tablet, Rfl: 0  No current facility-administered medications for this visit.    ALLERGIES:  Allergies   Allergen Reactions    Cherry Flavor - Food Allergy      Cherries    Latex Other (See Comments)    Other     Tea Tree Oil            REVIEW OF SYSTEMS:  Review of Systems    VITALS:  There were no vitals filed for this  "visit.    LABS:  N/a    _____________________________________________________  PHYSICAL EXAMINATION:  General: well developed and well nourished, alert, oriented times 3, and appears comfortable  Psychiatric: Normal  HEENT: Normocephalic, Atraumatic Trachea Midline, No torticollis  Pulmonary: No audible wheezing or respiratory distress   Abdomen/GI: Non tender, non distended   Cardiovascular: No pitting edema, 2+ radial pulse   Skin: No masses, erythema, lacerations, fluctation, ulcerations  Neurovascular: Sensation Intact to the Median, Ulnar, Radial Nerve, Motor Intact to the Median, Ulnar, Radial Nerve, and Pulses Intact  Musculoskeletal: Normal, except as noted in detailed exam and in HPI.      MUSCULOSKELETAL EXAMINATION:  left wrist -  Patient presents with no obvious anatomical deformity  Skin is warm and dry to touch with no signs of erythema, ecchymosis, infection  ROM WNL  TTP over left distal radius  MMT: 5/5 throughout AIN, APB, intrinsic  No soft tissue swelling or effusion noted  Full FDS, FDP, extensor mechanisms are intact  - Thenar atrophy, - intrinsic atrophy  Minimally positive Finklestein  Demonstrates wrist, elbow, and shoulder motion  Forearm compartments are soft and supple  2+ Distal radial pulse with brisk capillary refill to the fingers  Radial, median, ulnar motor and sensory distribution intact  Sensation to light touch intact distally      ___________________________________________________  STUDIES REVIEWED:  I have personally reviewed and interpreted  MRI of left wrist from 2/4/25 which demonstrates:   IMPRESSION:     Slight heterogeneous intermediate signal within the first extensor compartment tendons at the level of the radial styloid suggestive of minimal de Quervain's tendinosis.      LABS REVIEWED:    HgA1c: No results found for: \"HGBA1C\"  BMP:   Lab Results   Component Value Date    CALCIUM 9.5 01/23/2025    K 4.2 01/23/2025    CO2 29 01/23/2025     01/23/2025    BUN 10 " 01/23/2025    CREATININE 0.75 01/23/2025               PROCEDURES PERFORMED:  Procedures  No Procedures performed today    _____________________________________________________      Scribe Attestation      I,:  Ludivina Queen am acting as a scribe while in the presence of the attending physician.:       I,:  Andres Bazzi MD personally performed the services described in this documentation    as scribed in my presence.:

## 2025-02-17 NOTE — ASSESSMENT & PLAN NOTE
Reviewed physical exam and imaging with patient at time of visit. Radiographic findings demonstrate De Quervain's tenosynovitis of left wrist. Discussed treatment options including continued observation, activity modification, bracing, anti-inflammatories, hand therapy, possible cortisone injection however recommend avoiding due to hyperpigmentation of left wrist, versus surgical intervention.  Patient elected to proceed with conservative treatment at time of visit. Referral to hand therapy was placed at time of visit. Patient was advised to continue nighttime splinting. Patient will follow-up in the office in 8 weeks for re-evaluation of left wrist pain if symptoms persist or new symptoms arise. The patient expresses understanding and is in agreement with today's treatment plan.           Orders:    Ambulatory Referral to Orthopedic Surgery    Ambulatory Referral to PT/OT Hand Therapy; Future

## 2025-03-07 NOTE — PROGRESS NOTES
Cardiology Consultation     Artem Lama  [id number]  2000  [facility]  [address]  [place] [id number]  1. Tachycardia  Echo complete w/ contrast if indicated    POCT ECG    Zio Monitor        Patient Active Problem List   Diagnosis   • Left wrist pain   • De Quervain's tenosynovitis, left       HPI patient is here for cardiology evaluation.  There is concern in reference to racing heartbeat.  48-hour HM 12/30/2024 demonstrated NSR with average heart rate of 85.  There were no PACs or PVCs.  Symptom entries correlated with NSR.  Patient's PCP has been in the [facility] which she was advised to get a PCP at [facility].  Patient has had no CP or significant dyspnea.  Her VS are stable.  EKG demonstrates NSR with rare PACs and otherwise normal tracing.  Patient has episodes of skipped heartbeat which occur every other day.  She feels they were not caught on her prior HM.  She feels that her heart rate sometimes gets up to 150.  She avoids caffeine because of De Quervain's tenosynovitis.  She is a non-smoker.  She has occasional wine.  In reference to FH her father had tachycardia and relatives on her father side had issues.  Lupus also runs on her father side.    PMH-  Past Medical History:   Diagnosis Date   • Palpitations         SOCIAL HISTORY-  Social History     Socioeconomic History   • Marital status: Single     Spouse name: Not on file   • Number of children: Not on file   • Years of education: Not on file   • Highest education level: Not on file   Occupational History   • Not on file   Tobacco Use   • Smoking status: Never   • Smokeless tobacco: Never   Vaping Use   • Vaping status: Never Used   Substance and Sexual Activity   • Alcohol use: Not Currently   • Drug use: No   • Sexual activity: Not on file   Other Topics Concern   • Not on file   Social History Narrative   • Not on file      Social Drivers of Health     Financial Resource Strain: Patient Declined (2/5/2024)    [facility]    Overall Financial Resource Strain (CARDIA)    • Difficulty of Paying Living Expenses: Patient declined   Food Insecurity: Patient Declined (2/5/2024)    [facility]    Hunger Vital Sign    • Worried About Running Out of Food in the Last Year: Patient declined    • Ran Out of Food in the Last Year: Patient declined   Transportation Needs: Patient Declined (2/5/2024)    [facility]    PRAPARE - Transportation    • Lack of Transportation (Medical): Patient declined    • Lack of Transportation (Non-Medical): Patient declined   Physical Activity: Not on file   Stress: Patient Declined (2/5/2024)    Received from [facility], [facility]ational [facility] - Occupational Stress Questionnaire    • Feeling of Stress : Patient declined   Social Connections: Unknown (2/5/2024)    [facility]    OASIS : Social Isolation    • How often do you feel lonely or isolated from those around you?: Patient declines to respond   Intimate Partner Violence: Patient Declined (2/5/2024)    [facility]    Humiliation, Afraid, Rape, and Kick questionnaire    • Fear of Current or Ex-Partner: Patient declined    • Emotionally Abused: Patient declined    • Physically Abused: Patient declined    • Sexually Abused: Patient declined   Housing Stability: Not on file        FAMILY HISTORY-  No family history on file.    SURGICAL HISTORY-  No past surgical history on file.      Current Outpatient Medications:   •  Acetaminophen Extra Strength 500 MG tablet,  , Disp: , Rfl:   •  ferrous sulfate 325 (65 Fe) mg tablet, Take 325 mg by mouth daily with breakfast, Disp: , Rfl:   •  fluticasone (FLONASE) 50 mcg/act nasal spray, 1 spray into each nostril daily, Disp: 9.9 mL, Rfl: 0  •  Multiple Vitamin (multivitamin) capsule, Take 1 capsule by mouth daily, Disp: , Rfl:   •  albuterol (PROVENTIL HFA,VENTOLIN HFA) 90 mcg/act inhaler, INHALE 2 PUFFS BY MOUTH EVERY 6 HOURS AS NEEDED FOR WHEEZING (Patient not taking: Reported on 2/12/2025), Disp: 8.5 g, Rfl: 0  •  bacitracin topical ointment 500 units/g topical ointment, Apply 1 large application topically 2 (two) times a day (Patient not taking: Reported on 1/16/2023), Disp: 28 g, Rfl: 0  •  benzonatate (TESSALON) 200 MG capsule, Take 1 capsule (200 mg total) by mouth 3 (three) times a day as needed for cough (Patient not taking: Reported on 8/14/2023), Disp: 20 capsule, Rfl: 0  •  benzonatate (TESSALON) 200 MG capsule, Take 1 capsule (200 mg total) by mouth 3 (three) times a day as needed for cough (Patient not taking: Reported on 2/12/2025), Disp: 20 capsule, Rfl: 0  •  brompheniramine-pseudoephedrine-DM 30-2-10 MG/5ML syrup, Take 10 mL by mouth 3 (three) times a day as needed for allergies, cough or congestion (Patient not taking: Reported on 2/12/2025), Disp: 120 mL, Rfl: 0  •  cetirizine (ZyrTEC) 10 mg tablet, Take 10 mg by mouth daily (Patient not taking: Reported on 2/12/2025), Disp: , Rfl:   •  dicyclomine (BENTYL) 20 mg tablet, Take 1 tablet (20 mg total) by mouth 2 (two) times a day as needed (Abdominal pain, diarrhea) (Patient not taking: Reported on 1/16/2023), Disp: 20 tablet, Rfl: 0  •  ibuprofen (MOTRIN) 600 mg tablet, TAKE ONE TABLET BY MOUTH EVERY 6 HOURS AS NEEDED FOR MILD PAIN (PAIN SCORE 1-3) (Patient not taking: Reported on 1/16/2023), Disp: , Rfl:   •  ketorolac (TORADOL) 10 mg tablet, Take 1 tablet (10 mg total) by mouth every 6 (six) hours as needed for moderate pain or severe pain (Patient not taking:  "Reported on 3/17/2025), Disp: 20 tablet, Rfl: 0  •  levalbuterol (Xopenex HFA) 45 mcg/act inhaler, Inhale 1-2 puffs every 4 (four) hours as needed for shortness of breath (Patient not taking: Reported on 8/14/2023), Disp: 15 g, Rfl: 0  •  naproxen (NAPROSYN) 500 mg tablet, Take 1 tablet (500 mg total) by mouth 2 (two) times a day with meals (Patient not taking: Reported on 2/12/2025), Disp: 30 tablet, Rfl: 0  •  ondansetron (ZOFRAN-ODT) 4 mg disintegrating tablet, Take 1 tablet (4 mg total) by mouth every 6 (six) hours as needed for nausea or vomiting (Patient not taking: Reported on 1/16/2023), Disp: 20 tablet, Rfl: 0  •  pediatric multivitamin-iron (POLY-VI-SOL with IRON) 15 MG chewable tablet, Chew 1 tablet daily (Patient not taking: Reported on 1/16/2023), Disp: , Rfl:   •  predniSONE 20 mg tablet, Take 2 tablets (40 mg total) by mouth daily (Patient not taking: Reported on 1/16/2023), Disp: 10 tablet, Rfl: 0  Allergies   Allergen Reactions   • Other Anaphylaxis and Hives     Rye bread   • Cherry Flavor - Food Allergy      Cherries   • Latex Other (See Comments)     Other reaction(s): Other (See Comments)   • Tea Tree Oil      Vitals:    03/17/25 1320   BP: 100/66   BP Location: Left arm   Patient Position: Sitting   Cuff Size: Standard   Pulse: 62   SpO2: 100%   Weight: 71.8 kg (158 lb 4.8 oz)   Height: 4' 8\" (1.422 m)         Review of Systems:  Review of Systems   All other systems reviewed and are negative.      Physical Exam:  Physical Exam  Vitals reviewed.   Constitutional:       Appearance: She is well-developed.   HENT:      Head: Normocephalic and atraumatic.   Eyes:      Conjunctiva/sclera: Conjunctivae normal.   Cardiovascular:      Rate and Rhythm: Normal rate.      Heart sounds: Normal heart sounds.   Pulmonary:      Effort: Pulmonary effort is normal.      Breath sounds: Normal breath sounds.   Musculoskeletal:      Cervical back: Normal range of motion and neck supple.   Skin:     General: Skin " is warm and dry.   Neurological:      Mental Status: She is alert and oriented to person, place, and time.       Discussion/Summary: Patient is concerned about ongoing episodes of skipped heartbeat.  Will check a 2-week Zio patch to increase the monitoring interval.  She only previously had a 2-day monitor.  EKG today does have a PAC.  Will check an echo to exclude structural heart disease.  Will check LV function and valve structure and function.  I have asked her to call if there is a problem in the interim.  I will see her in follow-up in 4 to 6 months and sooner as is necessary.

## 2025-03-17 ENCOUNTER — NURSE TRIAGE (OUTPATIENT)
Age: 25
End: 2025-03-17

## 2025-03-17 ENCOUNTER — OFFICE VISIT (OUTPATIENT)
Dept: CARDIOLOGY CLINIC | Facility: CLINIC | Age: 25
End: 2025-03-17
Payer: COMMERCIAL

## 2025-03-17 VITALS
SYSTOLIC BLOOD PRESSURE: 100 MMHG | WEIGHT: 158.3 LBS | BODY MASS INDEX: 35.61 KG/M2 | OXYGEN SATURATION: 100 % | HEART RATE: 62 BPM | HEIGHT: 56 IN | DIASTOLIC BLOOD PRESSURE: 66 MMHG

## 2025-03-17 DIAGNOSIS — R00.0 TACHYCARDIA: Primary | ICD-10-CM

## 2025-03-17 PROCEDURE — 93000 ELECTROCARDIOGRAM COMPLETE: CPT | Performed by: INTERNAL MEDICINE

## 2025-03-17 PROCEDURE — 99244 OFF/OP CNSLTJ NEW/EST MOD 40: CPT | Performed by: INTERNAL MEDICINE

## 2025-04-01 ENCOUNTER — OFFICE VISIT (OUTPATIENT)
Dept: URGENT CARE | Age: 25
End: 2025-04-01
Payer: COMMERCIAL

## 2025-04-01 VITALS
DIASTOLIC BLOOD PRESSURE: 70 MMHG | TEMPERATURE: 97.4 F | RESPIRATION RATE: 18 BRPM | SYSTOLIC BLOOD PRESSURE: 110 MMHG | OXYGEN SATURATION: 98 % | HEART RATE: 92 BPM

## 2025-04-01 DIAGNOSIS — R59.9 GLANDS SWOLLEN: ICD-10-CM

## 2025-04-01 DIAGNOSIS — K08.89 PAIN, DENTAL: Primary | ICD-10-CM

## 2025-04-01 LAB — S PYO AG THROAT QL: NEGATIVE

## 2025-04-01 PROCEDURE — 99213 OFFICE O/P EST LOW 20 MIN: CPT

## 2025-04-01 PROCEDURE — S9083 URGENT CARE CENTER GLOBAL: HCPCS

## 2025-04-01 PROCEDURE — 87147 CULTURE TYPE IMMUNOLOGIC: CPT

## 2025-04-01 PROCEDURE — 87070 CULTURE OTHR SPECIMN AEROBIC: CPT

## 2025-04-01 RX ORDER — AMOXICILLIN 500 MG/1
500 CAPSULE ORAL EVERY 8 HOURS SCHEDULED
Qty: 21 CAPSULE | Refills: 0 | Status: SHIPPED | OUTPATIENT
Start: 2025-04-01 | End: 2025-04-08

## 2025-04-01 NOTE — PATIENT INSTRUCTIONS
Rapid strep performed in office found to be negative, throat culture results pending [facility]  Please begin antibiotics for dental pain as directed. Follow up with dentist as soon as possible. Go to ED for facial swelling, difficulty managing secretions, fever, etc.   May alternate Tylenol/ibuprofen as needed for fever.  May use Cepacol lozenges, Chloraseptic throat spray, warm salt water gargles and hot tea with honey as needed for sore throat.  Follow up with primary care provider if sensation of enlarged glands does not resolve within 1-2 weeks.

## 2025-04-01 NOTE — PROGRESS NOTES
[facility]  Name: Artem Lama      : 2000      MRN: [id number]  Encounter Provider: KIARRA Metz  Encounter Date: 2025   Encounter department: [facility] [place]  :  Rapid strep performed in office found to be negative, throat culture results pending and should be available in MyChart within 48 hours.  Please begin antibiotics for dental pain as directed. Follow up with dentist as soon as possible. Go to ED for facial swelling, difficulty managing secretions, fever, etc.   May alternate Tylenol/ibuprofen as needed for fever.  May use Cepacol lozenges, Chloraseptic throat spray, warm salt water gargles and hot tea with honey as needed for sore throat.  Follow up with primary care provider if sensation of enlarged glands does not resolve within 1-2 weeks.    Assessment & Plan  Glands swollen    Orders:    POCT rapid ANTIGEN strepA    Throat culture; Future    Pain, dental    Orders:    amoxicillin (AMOXIL) 500 mg capsule; Take 1 capsule (500 mg total) by mouth every 8 (eight) hours for 7 days        Patient Instructions  Patient Education     Dental Pain ED   General Information   You came to the Emergency Department (ED) for dental pain. This happens when the nerve in a tooth or the gum tissue around a tooth are irritated. A fractured tooth, tooth decay, or gum disease is most often the cause of tooth pain. Tooth problems can cause pain in other areas of the head and neck. At times, tooth problems may also cause ear and jaw pain. Pain from a sinus infection may also feel like tooth pain.  What care is needed at home?   Call your regular doctor to let them know you were in the ED. Make a follow-up appointment with a dentist if you were told to.  Avoid very cold or very hot food and drinks. These can make pain worse.  If you smoke, try to quit. Your doctor or nurse can help.  You may want to take medicine like ibuprofen, naproxen, or acetaminophen to help with  pain.  Brush your teeth at least 2 times a day. Use toothpaste with fluoride.  Use dental floss to clean between your teeth every day.  The doctor may have ordered antibiotics to treat an infection. It is important to take all your antibiotics even if you start to feel better.  When do I need to get emergency help?   Return to the ED if:   You have trouble breathing.  You have swelling of the tongue, neck, or face.  You have a fever of 100.4°F (38°C) or higher or chills.  You are not able to open your mouth or eat.  Your pain spreads into your jaw or neck or around your eyes.  When do I need to call the doctor?   You have discharge around a tooth or a foul taste or smell in your mouth.  You have trouble swallowing or chewing.  You have lots of bleeding from the gums or they become very swollen.  You have very bad pain that is not helped by pain medicines.  You have new or worsening symptoms.  Last Reviewed Date   2020-08-19  Consumer Information Use and Disclaimer   This generalized information is a limited summary of diagnosis, treatment, and/or medication information. It is not meant to be comprehensive and should be used as a tool to help the user understand and/or assess potential diagnostic and treatment options. It does NOT include all information about conditions, treatments, medications, side effects, or risks that may apply to a specific patient. It is not intended to be medical advice or a substitute for the medical advice, diagnosis, or treatment of a health care provider based on the health care provider's examination and assessment of a patient’s specific and unique circumstances. Patients must speak with a health care provider for complete information about their health, medical questions, and treatment options, including any risks or benefits regarding use of medications. This information does not endorse any treatments or medications as safe, effective, or approved for treating a specific patient.  "[employer] and its affiliates disclaim any warranty or liability relating to this information or the use thereof. The use of this information is governed by the Terms of Use, available at https://www.TranslationExchange.com/en/know/clinical-effectiveness-terms   Copyright   Follow up with PCP in 3-5 days.  Proceed to  ER if symptoms worsen.    If tests are performed, our office will contact you with results only if changes need to made to the care plan discussed with you at the visit. You can [facility].    Chief Complaint:   Chief Complaint   Patient presents with    Dental Pain     Tooth ache on right side started Tuesday. A week before that she had a procedure done at the dentist but the pain never went away. Unable to eat hard foods.     Sore Throat    Cold Like Symptoms     Patient having chest congestion and a feeling that something feels stuck in her throat that started yesterday.      History of Present Illness   Patient is a 24 year old female with PMH significant for palpitations, recently treated for strep in January of this year, who presents for evaluation of right sided dental pain and the sensation of swollen tonsils/glands. She had a dental procedure about one week ago (records unavailable), and has had pain of the right upper and lower teeth since then. She reports that she called the office but has not heard back yet, she did not receive antibiotics prior to or after the procedure. She does feel that there is some facial swelling around the affected area. She also notes a sensation of \"throat tightness\" and swollen glands since yesterday. She denies fever, cough, chills, body aches, n/v/d.           Review of Systems   Constitutional:  Negative for fatigue and fever.   HENT:  Positive for dental problem and sore throat. Negative for congestion, ear discharge, ear pain, postnasal drip, rhinorrhea, sinus pressure, sinus pain and sneezing.    Eyes: Negative.  Negative " for pain, discharge, redness and itching.   Respiratory: Negative.  Negative for apnea, cough, choking, chest tightness, shortness of breath, wheezing and stridor.    Cardiovascular: Negative.  Negative for chest pain and palpitations.   Gastrointestinal: Negative.  Negative for diarrhea, nausea and vomiting.   Endocrine: Negative.  Negative for polydipsia, polyphagia and polyuria.   Genitourinary: Negative.  Negative for decreased urine volume and flank pain.   Musculoskeletal: Negative.  Negative for arthralgias, back pain, myalgias, neck pain and neck stiffness.   Skin: Negative.  Negative for color change and rash.   Allergic/Immunologic: Negative.  Negative for environmental allergies.   Neurological: Negative.  Negative for dizziness, facial asymmetry, light-headedness, numbness and headaches.   Hematological: Negative.  Negative for adenopathy.   Psychiatric/Behavioral: Negative.       Past Medical History   Past Medical History:   Diagnosis Date    Palpitations      No past surgical history on file.  No family history on file.  she reports that she has never smoked. She has never used smokeless tobacco. She reports that she does not currently use alcohol. She reports that she does not use drugs.  Current Outpatient Medications   Medication Instructions    Acetaminophen Extra Strength 500 MG tablet     albuterol (PROVENTIL HFA,VENTOLIN HFA) 90 mcg/act inhaler 2 puffs, Inhalation, Every 6 hours PRN    amoxicillin (AMOXIL) 500 mg, Oral, Every 8 hours scheduled    bacitracin topical ointment 500 units/g topical ointment 1 large application, Topical, 2 times daily    benzonatate (TESSALON) 200 mg, Oral, 3 times daily PRN    benzonatate (TESSALON) 200 mg, Oral, 3 times daily PRN    brompheniramine-pseudoephedrine-DM 30-2-10 MG/5ML syrup 10 mL, Oral, 3 times daily PRN    cetirizine (ZYRTEC) 10 mg, Daily    dicyclomine (BENTYL) 20 mg, Oral, 2 times daily PRN    ferrous sulfate 325 mg, Daily with breakfast     fluticasone (FLONASE) 50 mcg/act nasal spray 1 spray, Nasal, Daily    ibuprofen (MOTRIN) 600 mg tablet TAKE ONE TABLET BY MOUTH EVERY 6 HOURS AS NEEDED FOR MILD PAIN (PAIN SCORE 1-3)    ketorolac (TORADOL) 10 mg, Oral, Every 6 hours PRN    levalbuterol (Xopenex HFA) 45 mcg/act inhaler 1-2 puffs, Inhalation, Every 4 hours PRN    Multiple Vitamin (multivitamin) capsule 1 capsule, Daily    naproxen (NAPROSYN) 500 mg, Oral, 2 times daily with meals    ondansetron (ZOFRAN-ODT) 4 mg, Oral, Every 6 hours PRN    pediatric multivitamin-iron (POLY-VI-SOL with IRON) 15 MG chewable tablet 1 tablet, Daily    predniSONE 40 mg, Oral, Daily     Allergies   Allergen Reactions    Other Anaphylaxis and Hives     Rye bread    Cherry Flavor - Food Allergy      Cherries    Latex Other (See Comments)     Other reaction(s): Other (See Comments)    Tea Tree Oil         Objective   /70   Pulse 92   Temp (!) 97.4 °F (36.3 °C)   Resp 18   SpO2 98%      Physical Exam  Vitals and nursing note reviewed.   Constitutional:       General: She is not in acute distress.     Appearance: She is well-developed. She is not ill-appearing, toxic-appearing or diaphoretic.      Interventions: She is not intubated.  HENT:      Head: Normocephalic and atraumatic.      Right Ear: Tympanic membrane and ear canal normal. Tympanic membrane is not erythematous.      Left Ear: Tympanic membrane and ear canal normal. Tympanic membrane is not erythematous.      Nose: No congestion or rhinorrhea.      Mouth/Throat:      Mouth: No oral lesions.      Dentition: Normal dentition. Does not have dentures. No gingival swelling, dental caries, dental abscesses or gum lesions.      Pharynx: Oropharynx is clear. Uvula midline. No pharyngeal swelling, oropharyngeal exudate, posterior oropharyngeal erythema, uvula swelling or postnasal drip.      Tonsils: No tonsillar exudate or tonsillar abscesses. 2+ on the right. 2+ on the left.     Eyes:      Conjunctiva/sclera:  "Conjunctivae normal.   Neck:      Thyroid: No thyroid mass, thyromegaly or thyroid tenderness.   Cardiovascular:      Rate and Rhythm: Normal rate and regular rhythm.      Heart sounds: Normal heart sounds, S1 normal and S2 normal. Heart sounds not distant. No murmur heard.     No friction rub. No gallop.   Pulmonary:      Effort: Pulmonary effort is normal. No tachypnea, bradypnea, accessory muscle usage, prolonged expiration, respiratory distress or retractions. She is not intubated.      Breath sounds: Normal breath sounds. No stridor, decreased air movement or transmitted upper airway sounds. No decreased breath sounds, wheezing, rhonchi or rales.   Abdominal:      Palpations: Abdomen is soft.      Tenderness: There is no abdominal tenderness.   Musculoskeletal:         General: No swelling.      Cervical back: Full passive range of motion without pain, normal range of motion and neck supple. No spinous process tenderness or muscular tenderness. Normal range of motion.   Lymphadenopathy:      Cervical: Cervical adenopathy present.      Right cervical: No superficial cervical adenopathy.     Left cervical: No superficial cervical adenopathy.   Skin:     General: Skin is warm and dry.      Capillary Refill: Capillary refill takes less than 2 seconds.   Neurological:      Mental Status: She is alert.   Psychiatric:         Mood and Affect: Mood normal.         Portions of the record may have been created with voice recognition software.  Occasional wrong word or \"sound a like\" substitutions may have occurred due to the inherent limitations of voice recognition software.  Read the chart carefully and recognize, using context, where substitutions have occurred.  "

## 2025-04-04 ENCOUNTER — RESULTS FOLLOW-UP (OUTPATIENT)
Dept: URGENT CARE | Age: 25
End: 2025-04-04

## 2025-04-04 LAB — BACTERIA THROAT CULT: ABNORMAL

## 2025-04-08 ENCOUNTER — HOSPITAL ENCOUNTER (OUTPATIENT)
Dept: NON INVASIVE DIAGNOSTICS | Facility: HOSPITAL | Age: 25
Discharge: HOME | End: 2025-04-08
Payer: COMMERCIAL

## 2025-04-08 VITALS
HEART RATE: 80 BPM | SYSTOLIC BLOOD PRESSURE: 110 MMHG | DIASTOLIC BLOOD PRESSURE: 70 MMHG | WEIGHT: 158 LBS | HEIGHT: 56 IN | BODY MASS INDEX: 35.54 KG/M2

## 2025-04-08 DIAGNOSIS — R00.0 TACHYCARDIA: ICD-10-CM

## 2025-04-08 LAB
AORTIC ROOT: 2.3 CM
BSA FOR ECHO PROCEDURE: 1.61 M2
E WAVE DECELERATION TIME: 221 MS
E/A RATIO: 2.69
FRACTIONAL SHORTENING: 37 (ref 28–44)
INTERVENTRICULAR SEPTUM IN DIASTOLE (PARASTERNAL SHORT AXIS VIEW): 1 CM
INTERVENTRICULAR SEPTUM: 1 CM (ref 0.6–1.1)
LAAS-AP2: 13 CM2
LAAS-AP4: 14.6 CM2
LEFT ATRIUM SIZE: 3.3 CM
LEFT ATRIUM VOLUME (MOD BIPLANE): 35 ML
LEFT ATRIUM VOLUME INDEX (MOD BIPLANE): 21.7 ML/M2
LEFT INTERNAL DIMENSION IN SYSTOLE: 2.4 CM (ref 2.1–4)
LEFT VENTRICULAR INTERNAL DIMENSION IN DIASTOLE: 3.8 CM (ref 3.5–6)
LEFT VENTRICULAR POSTERIOR WALL IN END DIASTOLE: 1 CM
LEFT VENTRICULAR STROKE VOLUME: 43 ML
LV EF US.2D.A4C+ESTIMATED: 62 %
LVSV (TEICH): 43 ML
MV E'TISSUE VEL-SEP: 15 CM/S
MV PEAK A VEL: 0.32 M/S
MV PEAK E VEL: 86 CM/S
MV STENOSIS PRESSURE HALF TIME: 64 MS
MV VALVE AREA P 1/2 METHOD: 3.44
RIGHT ATRIUM AREA SYSTOLE A4C: 12.7 CM2
RIGHT VENTRICLE ID DIMENSION: 3.1 CM
SL CV LEFT ATRIUM LENGTH A2C: 4.3 CM
SL CV LV EF: 60
SL CV PED ECHO LEFT VENTRICLE DIASTOLIC VOLUME (MOD BIPLANE) 2D: 63 ML
SL CV PED ECHO LEFT VENTRICLE SYSTOLIC VOLUME (MOD BIPLANE) 2D: 20 ML
TR MAX PG: 14 MMHG
TR PEAK VELOCITY: 1.9 M/S
TRICUSPID ANNULAR PLANE SYSTOLIC EXCURSION: 2.2 CM
TRICUSPID VALVE PEAK REGURGITATION VELOCITY: 1.88 M/S

## 2025-04-08 PROCEDURE — 93306 TTE W/DOPPLER COMPLETE: CPT | Performed by: INTERNAL MEDICINE

## 2025-04-08 PROCEDURE — 93306 TTE W/DOPPLER COMPLETE: CPT

## 2025-04-14 NOTE — PROGRESS NOTES
OT Evaluation     Today's date: 4/15/2025  Patient name: Artem Lama  : 2000  MRN: [id number]  Referring provider: Andres Bazzi MD  Dx:   Encounter Diagnosis     ICD-10-CM    1. De Quervain's tenosynovitis, left  M65.4       2. Left wrist pain  M25.532           Start Time: 0815  Stop Time: 0900  Total time in clinic (min): 45 minutes    Assessment  Impairments: impaired physical strength, lacks appropriate home exercise program and pain with function    Assessment details: Pt presents for OT eval with left wist pain dx de quervain's. Subjectively, she reports 8/10 pain at rest and numbness in her palm/fingers. She c/o pain with carrying and gripping tasks throughout her day such as turning a door knob or carrying groceries. Objectively, no pain reproduced with provocative testing and no numbness with testing for carpal tunnel.   strength is decreased on the L vs R. There is significant stiffness in her forearm musculature on both sides, worse on L. Pt demonstrates poor resting and dynamic posture as well as decreased motor control during stretches/exercises. Instructed on postural awareness, body mechanics and activity modification. Educated on theraband HEP for periscapular and rotator cuff strengthening. Also instructed pt to perform wrist stretches and gentle thumb isometrics. OT recommending services 1x/week for 4-6 weeks to progress with HEP. Will hold on custom thumb spica as pt's pain seems more mild at the moment. Pt understands/agrees with current POC.   Understanding of Dx/Px/POC: good     Prognosis: good    Goals  Short term goals:  To be achieved within 3-4 visits from start of care on 04/15/25     Patient will demonstrate independence with recommended AROM and stretching HEPs.   Patient will demonstrate independence with postural/positional awareness and /ergonomic correction recommendations.  Patient will be educated on and demonstrate understanding of  recommended nerve gliding/flossing techniques.   Patient will be instructed on the benefits and effective use of appropriate modalities for edema control, soft-tissue extensibility, and pain management.   Patient will be instructed on the benefits and effective use of appropriate adaptive equipment and/or bracing devices.   Patient will be report improved functional participation with implementation of recommended pacing, activity modifications, and symptom management strategies.     Long term goals:  To be achieved at time of discharge:   Patient will report decreased pain throughout normal roles/routines to <5/10.   Patient will report improved ability to complete ADLs/IADLs compared to IE.   Patient will demonstrate improved  and pinch strength to within 10% of the uninvolved side for increased readiness to return to hobbies/sports.   Patient will report readiness for d/c from OT.         Plan  Patient would benefit from: custom splinting and skilled occupational therapy  Referral necessary: Yes  Planned modality interventions: thermotherapy: hydrocollator packs    Planned therapy interventions: IADL retraining, activity modification, ADL retraining, behavior modification, body mechanics training, flexibility, functional ROM exercises, graded activity, graded exercise, home exercise program, therapeutic exercise, therapeutic activities, stretching, strengthening, orthotic fitting/training, orthotic management and training, patient education, manual therapy and joint mobilization    Frequency: 1x week  Duration in weeks: 6  Plan of Care beginning date: 4/15/2025  Plan of Care expiration date: 7/2/2025        Subjective Evaluation    History of Present Illness  Mechanism of injury: Artem is a 25 yo LHD female who presents to OT for eval of her left wrist pain. Saw ortho and was dx with de Quervain's tenosynovitis. 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 Pain worsened by November.  Intermittent pain since November. Saw Humberto Pastor and had injection. Injection helped for a couple weeks but then pain came back slowly. Wears a brace at night.     Occupational Profile  ADLs: no issues.     IADLs: pain with writing. Pain with holding shopping bags. Pain with turning door knob.     School: n/a    Driving: takes bus    Sport/Leisure: Does not play volley ball due to pain.     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       Patient Goals  Patient goals for therapy: increased strength, independence with ADLs/IADLs, return to sport/leisure activities, return to work, increased motion, decreased pain and decreased edema    Pain  Current pain ratin  At best pain rating: 3  At worst pain ratin  Quality: throbbing, discomfort and sharp (Shooting)  Relieving factors: ice and medications  Progression: no change    Treatments  Current treatment: occupational therapy        Objective    Tissue Integrity: lighter pigmentation 1st dorsal compartment that started after injection    Sensation (Ten-Test )  Right Hand (thumb to small finger): 10/10, 10/10, 10/10, 10/10, 10/10  Left Hand (thumb to small finger): 10/10, 10/10, 10/10, 10/10, 10/10    Special Tests:   Negative finkelstein's  No pain with resisted wrist or thumb extension  No pain with resisted thumb abduction in wrist flexion  Mild tenderness to palpation over 1st DC, same on uninvolved side  Negative Durkan's, negative Phalen's, negative Tinel's      Wrist   Right Left   Flexion 70 (47 with composite fist, elbow extended) 65 (40 with composite fist, elbow extended)   Extension 63 70    Radial Dev. 12 8   Ulnar Dev. 35 20       /Pinch Strength  Dynamometer R UE  L UE comments   Position #2 (lbs) 32.7 13.9 Pain on left             Precautions: Left wrist pain; L de quervain's tenosynovitis    Manuals  4/15 eval                 STM, IASTM                                                                                        Ther Ex                        Theraband proximal strengthening    Retraction  Shoulder ext  Reverse Fly  ER/IR  2x10 green band                Wrist stretches 3x30s             Thumb isometrics 5x5s hold                                                                                                                                                             Ther Activity                   Ergo edu                  Activity mod                                                                                                   Neuro Re-Ed                                                               Ortho Fit                  Thumb spica PRN                                             Modalities                  HP

## 2025-04-15 ENCOUNTER — EVALUATION (OUTPATIENT)
Dept: OCCUPATIONAL THERAPY | Age: 25
End: 2025-04-15
Payer: COMMERCIAL

## 2025-04-15 DIAGNOSIS — M25.532 LEFT WRIST PAIN: ICD-10-CM

## 2025-04-15 DIAGNOSIS — M65.4 DE QUERVAIN'S TENOSYNOVITIS, LEFT: Primary | ICD-10-CM

## 2025-04-15 PROCEDURE — 97110 THERAPEUTIC EXERCISES: CPT

## 2025-04-15 PROCEDURE — 97166 OT EVAL MOD COMPLEX 45 MIN: CPT

## 2025-04-20 NOTE — PROGRESS NOTES
Cardiology Follow Up    Artem Lama  2000  [id number]  [facility] [place]  [address]  [place] [id number] [phone]    1. Tachycardia  metoprolol succinate (TOPROL-XL) 25 mg 24 hr tablet          Interval History: HPI patient is here for FU.  There was concern in reference to racing heartbeat.  48-hour HM 12/30/2024 demonstrated NSR with average heart rate of 85.  There were no PACs or PVCs.  Symptom entries correlated with NSR.  Patient's PCP has been in the [facility] system which she was advised to get a PCP at [facility].  Patient has had no CP or significant dyspnea.  Her VS are stable. Patient has episodes of skipped heartbeat which occur every other day.  She feels they were not caught on her prior HM.  She feels that her heart rate sometimes gets up to 150.  She avoids caffeine because of De Quervain's tenosynovitis.  She is a non-smoker.  She has occasional wine.  Echocardiogram 4/8/2025 demonstrated LVEF of 60% with no significant valve disease.  Zio patch 4/30/2025 demonstrated NSR with average heart rate of 87.  There was no evidence of prognostically important arrhythmia.  Patient is still concerned however about the fact that her heart rate goes up and she is uncomfortable with it.  Will start low-dose metoprolol succinate 12.5 mg/day.    Patient Active Problem List   Diagnosis   • Left wrist pain   • De Quervain's tenosynovitis, left     Past Medical History:   Diagnosis Date   • Palpitations      Social History     Socioeconomic History   • Marital status: Single     Spouse name: Not on file   • Number of children: Not on file   • Years of education: Not on file   • Highest education level: Not on file   Occupational History   • Not on file   Tobacco Use   • Smoking status: Never   • Smokeless tobacco: Never   Vaping Use   • Vaping status: Never Used   Substance and Sexual Activity   • Alcohol use:  Not Currently   • Drug use: No   • Sexual activity: Not on file   Other Topics Concern   • Not on file   Social History Narrative   • Not on file     Social Drivers of Health     Financial Resource Strain: Patient Declined (2/5/2024)    [facility]    Overall Financial Resource Strain (CARDIA)    • Difficulty of Paying Living Expenses: Patient declined   Food Insecurity: Patient Declined (2/5/2024)    [facility]    Hunger Vital Sign    • Worried About Running Out of Food in the Last Year: Patient declined    • Ran Out of Food in the Last Year: Patient declined   Transportation Needs: Patient Declined (2/5/2024)    [facility]    PRAPARE - Transportation    • Lack of Transportation (Medical): Patient declined    • Lack of Transportation (Non-Medical): Patient declined   Physical Activity: Not on file   Stress: Patient Declined (2/5/2024)    [facility]    [employer] [place] [employer] - Occupational Stress Questionnaire    • Feeling of Stress : Patient declined   Social Connections: Unknown (2/5/2024)    [facility]    OASIS : Social Isolation    • How often do you feel lonely or isolated from those around you?: Patient declines to respond   Intimate Partner Violence: Patient Declined (2/5/2024)    [facility]    Humiliation, Afraid, Rape, and Kick questionnaire    • Fear of Current or Ex-Partner: Patient declined    • Emotionally Abused: Patient declined    • Physically Abused: Patient declined    • Sexually Abused: Patient declined   Housing Stability: Not on file      No family history on file.  No past  surgical history on file.    Current Outpatient Medications:   •  fluticasone (FLONASE) 50 mcg/act nasal spray, 1 spray into each nostril daily, Disp: 9.9 mL, Rfl: 0  •  ibuprofen (MOTRIN) 800 mg tablet, Take 800 mg by mouth every 6 (six) hours as needed, Disp: , Rfl:   •  metoprolol succinate (TOPROL-XL) 25 mg 24 hr tablet, Take 0.5 tablets (12.5 mg total) by mouth daily, Disp: 45 tablet, Rfl: 3  •  Multiple Vitamin (multivitamin) capsule, Take 1 capsule by mouth daily, Disp: , Rfl:   •  albuterol (PROVENTIL HFA,VENTOLIN HFA) 90 mcg/act inhaler, INHALE 2 PUFFS BY MOUTH EVERY 6 HOURS AS NEEDED FOR WHEEZING (Patient not taking: Reported on 2/12/2025), Disp: 8.5 g, Rfl: 0  •  bacitracin topical ointment 500 units/g topical ointment, Apply 1 large application topically 2 (two) times a day (Patient not taking: Reported on 1/16/2023), Disp: 28 g, Rfl: 0  •  brompheniramine-pseudoephedrine-DM 30-2-10 MG/5ML syrup, Take 10 mL by mouth 3 (three) times a day as needed for allergies, cough or congestion (Patient not taking: Reported on 2/12/2025), Disp: 120 mL, Rfl: 0  •  cetirizine (ZyrTEC) 10 mg tablet, Take 10 mg by mouth daily (Patient not taking: Reported on 2/12/2025), Disp: , Rfl:   •  ferrous sulfate 325 (65 Fe) mg tablet, Take 325 mg by mouth daily with breakfast (Patient not taking: Reported on 4/1/2025), Disp: , Rfl:   •  levalbuterol (Xopenex HFA) 45 mcg/act inhaler, Inhale 1-2 puffs every 4 (four) hours as needed for shortness of breath (Patient not taking: Reported on 8/14/2023), Disp: 15 g, Rfl: 0  Allergies   Allergen Reactions   • Other Anaphylaxis and Hives     Rye bread   • Cherry Flavor - Food Allergy      Cherries   • Latex Other (See Comments)     Other reaction(s): Other (See Comments)   • Tea Tree Oil        Labs:not applicable  Imaging: Echo complete w/ contrast if indicated  Result Date: 4/8/2025  Narrative: •  Left Ventricle: Left ventricular cavity size is normal. Wall thickness is normal. The left  "ventricular ejection fraction is 60%. Systolic function is normal. Wall motion is normal. Diastolic function is normal.       Review of Systems:  Review of Systems   All other systems reviewed and are negative.      Physical Exam:  /60 (BP Location: Right arm, Patient Position: Sitting, Cuff Size: Large)   Pulse 104   Ht 4' 8\" (1.422 m)   SpO2 99%   BMI 35.42 kg/m²   Physical Exam  Vitals reviewed.   Constitutional:       Appearance: She is well-developed.   HENT:      Head: Normocephalic and atraumatic.   Eyes:      Conjunctiva/sclera: Conjunctivae normal.   Cardiovascular:      Rate and Rhythm: Normal rate.      Heart sounds: Normal heart sounds.   Pulmonary:      Effort: Pulmonary effort is normal.      Breath sounds: Normal breath sounds.   Musculoskeletal:      Cervical back: Normal range of motion and neck supple.   Skin:     General: Skin is warm and dry.   Neurological:      Mental Status: She is alert and oriented to person, place, and time.       Discussion/Summary: Will start metoprolol succinate 12.5 mg/day.  Will titrate as necessary.  Fortunately there is no evidence of prognostically important arrhythmia on the Zio patch.  I have asked the patient to call if there is a problem in the interim.  I will see her in follow-up in 6 months and sooner as is necessary.  "

## 2025-04-23 ENCOUNTER — OFFICE VISIT (OUTPATIENT)
Dept: OCCUPATIONAL THERAPY | Age: 25
End: 2025-04-23
Attending: SURGERY
Payer: COMMERCIAL

## 2025-04-23 DIAGNOSIS — M25.532 LEFT WRIST PAIN: ICD-10-CM

## 2025-04-23 DIAGNOSIS — M65.4 DE QUERVAIN'S TENOSYNOVITIS, LEFT: Primary | ICD-10-CM

## 2025-04-23 PROCEDURE — 97140 MANUAL THERAPY 1/> REGIONS: CPT

## 2025-04-23 PROCEDURE — 97110 THERAPEUTIC EXERCISES: CPT

## 2025-04-23 NOTE — PROGRESS NOTES
Daily Note     Today's date: 2025  Patient name: Artem Lama  : 2000  MRN: [id number]  Referring provider: Andres Bazzi MD  Dx:   Encounter Diagnosis     ICD-10-CM    1. De Quervain's tenosynovitis, left  M65.4       2. Left wrist pain  M25.532                      Subjective: ***      Objective: See treatment diary below      Assessment: Tolerated treatment well. Patient would benefit from continued OT      Plan: Continue per plan of care.      Precautions: Left wrist pain; L de quervain's tenosynovitis    Manuals  4/15 eval                 STM, IASTM                                                                                        Ther Ex                       Theraband proximal strengthening    Retraction  Shoulder ext  Reverse Fly  ER/IR  2x10 green band                Wrist stretches 3x30s             Thumb isometrics 5x5s hold                                                                                                                                                             Ther Activity                   Ergo edu                  Activity mod                                                                                                   Neuro Re-Ed                                                               Ortho Fit                  Thumb spica PRN                                             Modalities                  HP

## 2025-04-23 NOTE — PROGRESS NOTES
"Daily Note     Today's date: 2025  Patient name: Artem Lama  : 2000  MRN: [id number]  Referring provider: Andres Bazzi MD  Dx:   Encounter Diagnosis     ICD-10-CM    1. De Quervain's tenosynovitis, left  M65.4       2. Left wrist pain  M25.532           Start Time: 0830  Stop Time: 0915  Total time in clinic (min): 45 minutes    Subjective: \"The other day I had the worst pain possible. I was handwriting a lot for school.\"      Objective: See treatment diary below      Assessment: Tolerated treatment well. Reviewed wrist stretches and instructed on wrist and thumb AROM and isometric exercises. Pt requires frequent cues to perform exercises as instructed I.e. positioning, number of reps, time under tension, etc. Patient would benefit from continued OT      Plan: Continue per plan of care.      Precautions: Left wrist pain; L de quervain's tenosynovitis    Manuals  4/15 eval                 STM, IASTM    10'                                                                                    Ther Ex                       Theraband proximal strengthening    Retraction  Shoulder ext  Reverse Fly  ER/IR  2x10 green band  x10 each, reviewed HEP.              Wrist stretches 3x30s 3x30s            Thumb isometrics 5x5s hold Wrist and thumb isometrics    5x5s each            Wrist, thumb AROM  X20 each                                                                                                                                              Ther Activity                   Ergo edu                  Activity mod                                                                                                   Neuro Re-Ed                                                               Ortho Fit                  Thumb spica PRN                                             Modalities                  HP                                              "

## 2025-04-23 NOTE — HOME EXERCISE EDUCATION
Program_ID:[id number]   Access Code: [email or web address]  URL: [email or web address]  Date: 04-  Prepared By: Elda Callejas    Program Notes      Exercises      - Isometric Wrist Extension Pronated - 1 x daily - 7 x weekly - 3 sets - 10 reps      - Seated Isometric Wrist Flexion Supinated with Manual Resistance - 1 x daily - 7 x weekly - 3 sets - 10 reps      - Seated Isometric Wrist Radial Deviation with Manual Resistance - 1 x daily - 7 x weekly - 3 sets - 10 reps      - Seated Isometric Wrist Ulnar Deviation with Manual Resistance - 1 x daily - 7 x weekly - 3 sets - 10 reps      - Seated Isometric Thumb Abduction - 1 x daily - 7 x weekly - 3 sets - 10 reps

## 2025-04-24 LAB
CV ZIO BASELINE AVG BPM: 87 BPM
CV ZIO BASELINE BPM HIGH: 177 BPM
CV ZIO BASELINE BPM LOW: 48 BPM
CV ZIO DEVICE ANALYSIS TIME: NORMAL
CV ZIO ECT SVE COUNT: 2 EPISODES
CV ZIO ECT SVE CPLT COUNT: 1 EPISODES
CV ZIO ECT SVE CPLT FREQ: NORMAL
CV ZIO ECT SVE FREQ: NORMAL
CV ZIO ECT SVE TPLT COUNT: 0 EPISODES
CV ZIO ECT SVE TPLT FREQ: 0
CV ZIO ECT VE COUNT: 0 EPISODES
CV ZIO ECT VE CPLT COUNT: 0 EPISODES
CV ZIO ECT VE CPLT FREQ: 0
CV ZIO ECT VE FREQ: 0
CV ZIO ECT VE TPLT COUNT: 0 EPISODES
CV ZIO ECT VE TPLT FREQ: 0
CV ZIO ECTOPIC SVE COUPLET RAW PERCENT: 0 %
CV ZIO ECTOPIC SVE ISOLATED PERCENT: 0 %
CV ZIO ECTOPIC SVE TRIPLET RAW PERCENT: 0 %
CV ZIO ECTOPIC VE COUPLET RAW PERCENT: 0 %
CV ZIO ECTOPIC VE ISOLATED PERCENT: 0 %
CV ZIO ECTOPIC VE TRIPLET RAW PERCENT: 0 %
CV ZIO ENROLLMENT END: NORMAL
CV ZIO ENROLLMENT START: NORMAL
CV ZIO PATIENT EVENTS DIARIES: 6
CV ZIO PATIENT EVENTS TRIGGERS: 5
CV ZIO PAUSE COUNT: 0
CV ZIO PRESCRIPTION STATUS: NORMAL
CV ZIO SVT COUNT: 0
CV ZIO TOTAL  ENROLLMENT PERIOD: NORMAL
CV ZIO VT COUNT: 0

## 2025-04-30 ENCOUNTER — OFFICE VISIT (OUTPATIENT)
Dept: CARDIOLOGY CLINIC | Facility: CLINIC | Age: 25
End: 2025-04-30
Payer: COMMERCIAL

## 2025-04-30 ENCOUNTER — APPOINTMENT (OUTPATIENT)
Dept: OCCUPATIONAL THERAPY | Age: 25
End: 2025-04-30
Attending: SURGERY
Payer: COMMERCIAL

## 2025-04-30 VITALS
OXYGEN SATURATION: 99 % | SYSTOLIC BLOOD PRESSURE: 102 MMHG | BODY MASS INDEX: 35.42 KG/M2 | DIASTOLIC BLOOD PRESSURE: 60 MMHG | HEART RATE: 104 BPM | HEIGHT: 56 IN

## 2025-04-30 DIAGNOSIS — R00.0 TACHYCARDIA: Primary | ICD-10-CM

## 2025-04-30 PROCEDURE — 99214 OFFICE O/P EST MOD 30 MIN: CPT | Performed by: INTERNAL MEDICINE

## 2025-04-30 RX ORDER — METOPROLOL SUCCINATE 25 MG/1
12.5 TABLET, EXTENDED RELEASE ORAL DAILY
Qty: 45 TABLET | Refills: 3 | Status: SHIPPED | OUTPATIENT
Start: 2025-04-30

## 2025-04-30 RX ORDER — IBUPROFEN 800 MG/1
800 TABLET, FILM COATED ORAL EVERY 6 HOURS PRN
COMMUNITY
Start: 2025-04-02

## 2025-04-30 NOTE — PROGRESS NOTES
Zio patch report    Patient had a min HR of 48 bpm, max HR of 177 bpm, and avg HR of 87 bpm. Predominant underlying rhythm was Sinus Rhythm. Isolated SVEs were rare (<1.0%), SVE Couplets were rare (<1.0%), and no SVE Triplets were present. No Isolated VEs, VE Couplets, or VE Triplets were present.    Impression:    1.  NSR noted throughout the monitoring interval with average heart rate of 87.  2.  No premature ventricular ectopy.  3.  Rare premature supraventricular ectopy.  No evidence of atrial fibrillation/flutter.  4.  No significant bradycardia.  5.  Patient complained of lightheadedness.  At this time the patient's rhythm was sinus at a rate of 85.  Patient complained of racing.  At this time the patient's rhythm was sinus at a rate of 109.  Patient was walking.  Patient complained of racing.  Patient's rhythm was sinus at a rate of 119.  Patient was exercising.  Patient complained of racing.  Patient's rhythm was sinus at a rate of 142.  Patient was walking.  Patient complained of chest pain, lightheadedness and racing.  Patient's rhythm was sinus at a rate of 99.  Patient complained of chest pain and racing.  Patient's rhythm was sinus at a rate of 89.  6.  No evidence of prognostically important arrhythmia noted.  Symptoms did not correlate with significant arrhythmia.

## 2025-04-30 NOTE — PATIENT INSTRUCTIONS
Please start metoprolol succinate 12.5 mg/day.  This will be half a tablet per day.  Please call if there is a problem.  I will see you in follow-up.

## 2025-05-07 ENCOUNTER — APPOINTMENT (OUTPATIENT)
Dept: OCCUPATIONAL THERAPY | Age: 25
End: 2025-05-07
Attending: SURGERY
Payer: COMMERCIAL

## 2025-05-07 NOTE — PROGRESS NOTES
"Daily Note     Today's date: 2025  Patient name: Artem Lama  : 2000  MRN: [id number]  Referring provider: Andres Bazzi MD  Dx:   Encounter Diagnosis     ICD-10-CM    1. De Quervain's tenosynovitis, left  M65.4       2. Left wrist pain  M25.532                      Subjective: \"***l.\"      Objective: See treatment diary below      Assessment: Tolerated treatment well. ***. Patient would benefit from continued OT      Plan: Continue per plan of care.      Precautions: Left wrist pain; L de quervain's tenosynovitis    Manuals  4/15 eval               STM, IASTM    10'                                                                                    Ther Ex                       Theraband proximal strengthening    Retraction  Shoulder ext  Reverse Fly  ER/IR  2x10 green band  x10 each, reviewed HEP.              Wrist stretches 3x30s 3x30s            Thumb isometrics 5x5s hold Wrist and thumb isometrics    5x5s each            Wrist, thumb AROM  X20 each                                                                                                                                              Ther Activity                   Ergo edu                  Activity mod                                                                                                   Neuro Re-Ed                                                               Ortho Fit                  Thumb spica PRN                                             Modalities                  HP                                              "

## 2025-05-12 ENCOUNTER — RESULTS FOLLOW-UP (OUTPATIENT)
Dept: CARDIOLOGY CLINIC | Facility: CLINIC | Age: 25
End: 2025-05-12

## 2025-05-12 NOTE — TELEPHONE ENCOUNTER
Patient returned call.  Discussed Dr. Mathews's Zio patch result note with patient.  Patient states she cannot take anymore than metoprolol 12.5 mg/day because it drops her sugar levels.  She wants to stay on metoprolol 12.5 mg once per day. States she just saw Dr. Mathews 4/30, next appointment is 1/6/26.

## 2025-05-12 NOTE — TELEPHONE ENCOUNTER
GARY Mcigll to call back for Zio patch results. If Artem calls back please relay Dr. Mathews's message below.

## 2025-05-12 NOTE — TELEPHONE ENCOUNTER
----- Message from Miguel Mathews MD sent at 5/12/2025 10:21 AM EDT -----  Please call patient.  Zio patch showed no significant rhythm issue.  During some of the instances when she felt her heart racing her heart rate was elevated with sinus tachycardia.  She is already on metoprolol succinate 12.5 mg/day.  She can try taking this twice a day to see if it helps.  Will explain in detail when she comes to the office.  Thank you.

## 2025-05-14 ENCOUNTER — OFFICE VISIT (OUTPATIENT)
Dept: OCCUPATIONAL THERAPY | Age: 25
End: 2025-05-14
Attending: SURGERY
Payer: COMMERCIAL

## 2025-05-14 DIAGNOSIS — M25.532 LEFT WRIST PAIN: ICD-10-CM

## 2025-05-14 DIAGNOSIS — M65.4 DE QUERVAIN'S TENOSYNOVITIS, LEFT: Primary | ICD-10-CM

## 2025-05-14 PROCEDURE — 97110 THERAPEUTIC EXERCISES: CPT

## 2025-05-14 PROCEDURE — 97140 MANUAL THERAPY 1/> REGIONS: CPT

## 2025-05-14 NOTE — PROGRESS NOTES
"Daily Note     Today's date: 2025  Patient name: Artem Lama  : 2000  MRN: [id number]  Referring provider: Andres Bazzi MD  Dx:   Encounter Diagnosis     ICD-10-CM    1. De Quervain's tenosynovitis, left  M65.4       2. Left wrist pain  M25.532             Start Time: 838  Stop Time: 918  Total time in clinic (min): 40 minutes    Subjective: \"It's feeling a little better but it hurt when I lifted my mom's wheelchair from the trunk.\"      Objective: See treatment diary below      Assessment: Tolerated treatment well. Pt reports improvement with pain since last session. Able to tolerate all exercises with no issues. Demonstrates improving understanding of her strengthening exercises. Requires cues for posture/positioning and to implement rest breaks. Patient would benefit from continued OT      Plan: Continue per plan of care.      Precautions: Left wrist pain; L de quervain's tenosynovitis    Manuals  4/15 eval               STM, IASTM    10'  10'                                                                                  Ther Ex                       Theraband proximal strengthening    Retraction  Shoulder ext  Reverse Fly  ER/IR  2x10 green band  x10 each, reviewed HEP.  Purple retraction  3x10    Light green extension, reverse fly, and IR/ER  3x10            Wrist stretches 3x30s 3x30s 3x30s           Thumb isometrics 5x5s hold Wrist and thumb isometrics    5x5s each 2lb Wrist curls 3x10    Light rubber band extensions 2x20           Wrist, thumb AROM  X20 each                                                                                                                                              Ther Activity                   Ergo edu                  Activity mod                                                                                                   Neuro Re-Ed                                                               Ortho Fit                "   Thumb spica PRN                                             Modalities                  HP

## 2025-06-04 ENCOUNTER — APPOINTMENT (OUTPATIENT)
Dept: OCCUPATIONAL THERAPY | Age: 25
End: 2025-06-04
Attending: SURGERY
Payer: COMMERCIAL

## 2025-06-11 ENCOUNTER — APPOINTMENT (OUTPATIENT)
Dept: OCCUPATIONAL THERAPY | Age: 25
End: 2025-06-11
Attending: SURGERY
Payer: COMMERCIAL

## 2025-06-16 ENCOUNTER — OFFICE VISIT (OUTPATIENT)
Dept: OCCUPATIONAL THERAPY | Age: 25
End: 2025-06-16
Attending: SURGERY
Payer: COMMERCIAL

## 2025-06-16 DIAGNOSIS — M65.4 DE QUERVAIN'S TENOSYNOVITIS, LEFT: Primary | ICD-10-CM

## 2025-06-16 PROCEDURE — 97110 THERAPEUTIC EXERCISES: CPT

## 2025-06-16 PROCEDURE — 97168 OT RE-EVAL EST PLAN CARE: CPT

## 2025-06-16 PROCEDURE — 97140 MANUAL THERAPY 1/> REGIONS: CPT

## 2025-06-16 NOTE — PROGRESS NOTES
"OT Discharge     Today's date: 2025  Patient name: Artem Lama  : 2000  MRN: [id number]  Referring provider: Andres Bazzi MD  Dx:   Encounter Diagnosis     ICD-10-CM    1. De Quervain's tenosynovitis, left  M65.4               Start Time: 0950  Stop Time: 1025  Total time in clinic (min): 35 minutes    Subjective: \"It's better. I still have some good days and bad days.\"      Objective: See treatment diary below    Wrist    Right Left   Flexion 70 (47 with composite fist, elbow extended) 65 (53 with composite fist) (was 40 with composite fist)   Extension 63 72 (was 70)    Radial Dev. 12 17 (was 8)   Ulnar Dev. 35 30 (was 20)     /Pinch Strength  Dynamometer R UE  L UE comments   Position #2 (lbs) 40.1 (was 32.7) 27.9 (was 13.9) Mild discomfort on L (was Pain on left)        Assessment: Tolerated treatment well. AROM and strength have improved compared to eval and are WFL. Reviewed all stretches, massage techniques and exercises with patient. She is confident continuing her HEPs and being discharged from OT at this time.      Plan: Discharge from OT.      Precautions: Left wrist pain; L de quervain's tenosynovitis    Manuals  4/15 eval             STM, IASTM    10'  10'  10'                                                                                Ther Ex                       Theraband proximal strengthening    Retraction  Shoulder ext  Reverse Fly  ER/IR  2x10 green band  x10 each, reviewed HEP.  Purple retraction  3x10    Light green extension, reverse fly, and IR/ER  3x10            Wrist stretches 3x30s 3x30s 3x30s 3x30s          Thumb isometrics 5x5s hold Wrist and thumb isometrics    5x5s each 2lb Wrist curls 3x10    Light rubber band extensions 2x20 2lb Wrist curls 3x10    Light rubber band extensions 2x20          Wrist, thumb AROM  X20 each  x20                                                                                                          "                                   Ther Activity                   Ergo edu                  Activity mod                                                                                                   Neuro Re-Ed                                                               Ortho Fit                  Thumb spica PRN                                             Modalities                  HP        5'

## 2025-06-18 ENCOUNTER — OFFICE VISIT (OUTPATIENT)
Dept: OBGYN CLINIC | Facility: CLINIC | Age: 25
End: 2025-06-18
Payer: COMMERCIAL

## 2025-06-18 VITALS — WEIGHT: 165 LBS | DIASTOLIC BLOOD PRESSURE: 82 MMHG | BODY MASS INDEX: 36.99 KG/M2 | SYSTOLIC BLOOD PRESSURE: 126 MMHG

## 2025-06-18 DIAGNOSIS — N76.0 ACUTE VAGINITIS: Primary | ICD-10-CM

## 2025-06-18 PROCEDURE — 81514 NFCT DS BV&VAGINITIS DNA ALG: CPT | Performed by: OBSTETRICS & GYNECOLOGY

## 2025-06-18 PROCEDURE — 99213 OFFICE O/P EST LOW 20 MIN: CPT | Performed by: OBSTETRICS & GYNECOLOGY

## 2025-06-18 NOTE — PATIENT INSTRUCTIONS
The patient was informed of findings possible vaginal infection.  Culture was done.  Will hold off any therapy to receive the results of the culture.  She will avoid intercourse that she hears from us.

## 2025-06-18 NOTE — PROGRESS NOTES
This is a 24-year-old female she is nulliparous she is a stable relationship.  Her current method of contraception includes condoms.  Her last menstrual cycle was 9 June.  She is now complaining of 2-day duration of external discharge and itching and burning.  Feels the skin is dry.    Speculum examination shows questionable discharge.  A culture was done.  The vagina wall looks to be normal.  The cervix to be normal.l impression: Possible yeast infection.  Molecular panel was done.  Will hold off any treatment to get the results of the culture.  She will avoid intercourse until she hears from us.

## 2025-06-19 LAB
C GLABRATA DNA VAG QL NAA+PROBE: NEGATIVE
C KRUSEI DNA VAG QL NAA+PROBE: NEGATIVE
CANDIDA SP 6 PNL VAG NAA+PROBE: POSITIVE
T VAGINALIS DNA VAG QL NAA+PROBE: NEGATIVE
VAGINOSIS/ITIS DNA PNL VAG PROBE+SIG AMP: NEGATIVE

## 2025-06-20 ENCOUNTER — RESULTS FOLLOW-UP (OUTPATIENT)
Dept: OBGYN CLINIC | Facility: CLINIC | Age: 25
End: 2025-06-20

## 2025-06-20 DIAGNOSIS — B37.31 VAGINAL YEAST INFECTION: Primary | ICD-10-CM

## 2025-06-20 RX ORDER — FLUCONAZOLE 200 MG/1
TABLET ORAL
Qty: 2 TABLET | Refills: 1 | Status: SHIPPED | OUTPATIENT
Start: 2025-06-20 | End: 2025-06-21

## 2025-07-23 ENCOUNTER — OFFICE VISIT (OUTPATIENT)
Dept: OBGYN CLINIC | Facility: CLINIC | Age: 25
End: 2025-07-23
Payer: COMMERCIAL

## 2025-07-23 VITALS — BODY MASS INDEX: 36.99 KG/M2 | SYSTOLIC BLOOD PRESSURE: 112 MMHG | WEIGHT: 165 LBS | DIASTOLIC BLOOD PRESSURE: 82 MMHG

## 2025-07-23 DIAGNOSIS — N89.8 VAGINA ITCHING: Primary | ICD-10-CM

## 2025-07-23 DIAGNOSIS — N89.8 VAGINAL DISCHARGE: ICD-10-CM

## 2025-07-23 PROCEDURE — 99213 OFFICE O/P EST LOW 20 MIN: CPT | Performed by: OBSTETRICS & GYNECOLOGY

## 2025-07-23 PROCEDURE — 81514 NFCT DS BV&VAGINITIS DNA ALG: CPT | Performed by: OBSTETRICS & GYNECOLOGY

## 2025-07-23 NOTE — PROGRESS NOTES
24-year-old female who was recently treated for yeast infection a month ago.  She is now coming back with the same symptoms.  She is not sexually active.  Is been no antibiotic use.  But she get does get a note to improve for physical therapy with her mother twice a week.  And she thinks is provide if no wet bathing suit.  She continues to complain about vaginal itching and odor.    Examination of the vagina shows discharge but not true classic yeast infection a molecular panel was performed.  She will be informed results of the molecular panel this could be BV.  Will wait for therapy to see the results.  Hopefully with the results by tomorrow we can inform the patient.  We also talked about the possibility using boric acid for prophylaxis.  We will reconsider this once we have results of the culture.

## 2025-07-23 NOTE — PATIENT INSTRUCTIONS
The patient was seen today for recurrence of vaginal discharge and itching suspicious for yeast infection.  We repeated a culture.  We think this might be resulting from her pool use with her mother for physical therapy.  She was reminded to get out of her wet bathing suit and dry off and shower.  We may consider boric acid for prophylactic if this is true or awaiting the results of the molecular panel.

## 2025-07-24 ENCOUNTER — TELEPHONE (OUTPATIENT)
Age: 25
End: 2025-07-24

## 2025-07-24 LAB
C GLABRATA DNA VAG QL NAA+PROBE: NEGATIVE
C KRUSEI DNA VAG QL NAA+PROBE: NEGATIVE
CANDIDA SP 6 PNL VAG NAA+PROBE: NEGATIVE
T VAGINALIS DNA VAG QL NAA+PROBE: NEGATIVE
VAGINOSIS/ITIS DNA PNL VAG PROBE+SIG AMP: NEGATIVE

## 2025-08-11 ENCOUNTER — APPOINTMENT (OUTPATIENT)
Dept: LAB | Age: 25
End: 2025-08-11
Attending: NURSE PRACTITIONER
Payer: COMMERCIAL